# Patient Record
Sex: MALE | Race: WHITE | NOT HISPANIC OR LATINO | Employment: FULL TIME | URBAN - METROPOLITAN AREA
[De-identification: names, ages, dates, MRNs, and addresses within clinical notes are randomized per-mention and may not be internally consistent; named-entity substitution may affect disease eponyms.]

---

## 2018-06-11 ENCOUNTER — TELEPHONE (OUTPATIENT)
Dept: INTERNAL MEDICINE | Facility: CLINIC | Age: 43
End: 2018-06-11

## 2018-06-11 ENCOUNTER — OFFICE VISIT (OUTPATIENT)
Dept: INTERNAL MEDICINE | Facility: CLINIC | Age: 43
End: 2018-06-11

## 2018-06-11 VITALS
HEIGHT: 73 IN | DIASTOLIC BLOOD PRESSURE: 80 MMHG | OXYGEN SATURATION: 98 % | WEIGHT: 227 LBS | BODY MASS INDEX: 30.09 KG/M2 | HEART RATE: 85 BPM | SYSTOLIC BLOOD PRESSURE: 128 MMHG

## 2018-06-11 DIAGNOSIS — R79.89 LOW TESTOSTERONE IN MALE: Primary | ICD-10-CM

## 2018-06-11 DIAGNOSIS — N52.2 DRUG-INDUCED ERECTILE DYSFUNCTION: ICD-10-CM

## 2018-06-11 PROBLEM — F19.11 HISTORY OF SUBSTANCE ABUSE (HCC): Status: ACTIVE | Noted: 2018-06-11

## 2018-06-11 PROBLEM — Z86.59 HISTORY OF DEPRESSION: Status: ACTIVE | Noted: 2018-06-11

## 2018-06-11 PROCEDURE — 99203 OFFICE O/P NEW LOW 30 MIN: CPT | Performed by: NURSE PRACTITIONER

## 2018-06-11 RX ORDER — TESTOSTERONE 16.2 MG/G
4 GEL TRANSDERMAL
COMMUNITY
Start: 2018-03-05 | End: 2018-06-11 | Stop reason: SDUPTHER

## 2018-06-11 RX ORDER — SILDENAFIL CITRATE 20 MG/1
TABLET ORAL
Qty: 20 TABLET | Refills: 1 | Status: SHIPPED | OUTPATIENT
Start: 2018-06-11 | End: 2018-08-22 | Stop reason: SDUPTHER

## 2018-06-11 RX ORDER — TESTOSTERONE 16.2 MG/G
4 GEL TRANSDERMAL DAILY
Qty: 1 G | Refills: 0 | Status: SHIPPED | OUTPATIENT
Start: 2018-06-11 | End: 2018-08-22 | Stop reason: SDUPTHER

## 2018-06-11 RX ORDER — BUPRENORPHINE HYDROCHLORIDE AND NALOXONE HYDROCHLORIDE 5.7; 1.4 MG/1; MG/1
TABLET, ORALLY DISINTEGRATING SUBLINGUAL
COMMUNITY
Start: 2018-06-07

## 2018-06-11 NOTE — TELEPHONE ENCOUNTER
Pharmacy called stating that pt stated that he usually gets 4 boxes to last him a month and only 1 was sent in. Verbal ok to give pt 4 boxes ok per Whit.

## 2018-06-11 NOTE — PROGRESS NOTES
Subjective   Taras Phipps is a 42 y.o. male.   Chief Complaint   Patient presents with   • Establish Care   • Med Refill     natanael gel      History of Present Illness patient is here to establish care.  He is wanting a refill of Natanael gel that he takes for decreased testosterone level which he says is related to zubsolv usage.  He reports he had his last testosterone level checked approximately 3 months ago it [facility].  He states he is no longer going there as he did not feel comfortable with his provider.  He reports that AndroGel helps his energy.      The following portions of the patient's history were reviewed and updated as appropriate: allergies, current medications, past family history, past medical history, past social history, past surgical history and problem list.    Current Outpatient Prescriptions:   •  ANDROGEL PUMP 20.25 MG/ACT (1.62%) gel, Apply 4 applicators topically Daily., Disp: 1 g, Rfl: 0  •  sildenafil (REVATIO) 20 MG tablet, Use 1-3 tablets as needed PT intercourse, Disp: 20 tablet, Rfl: 1  •  ZUBSOLV 5.7-1.4 MG sublingual tablet, , Disp: , Rfl:     Review of Systems:   Constitutional : He denies unexplained weight loss, fevers chills or fatigue.  He says his energy is good  HEENT: He denies daily headaches, ear pain, ringing in the ears, sore throat, runny nose, blurring of vision.  He does not wear glasses or contacts.  Respiratory: Denies cough, shortness of air wheeze  Cardiovascular: Denies chest pain, fast or irregular heartbeats, rheumatic fever  GI: Denies heartburn, abdominal pain, blood in stool, nausea vomiting  : Has erectile dysfunction and uses generic Viagra with relief.  Denies dysuria.  No history of STD   Neuro mental he has a remote history of depression which manifested itself in anger.  He says it is under touch better control and does not take any medicines.  He is not in counseling.    Skin: No rashes  Hematological: no bruising, free bleeding  "    Family history:   Mom is .  She had a history of alcoholism and pill dependence.  She also had depression and unknown type of cancer   Father alive.  He uses alcohol regularly and has depression.   There is remote history of arthritis CAD hypertension obesity and thyroid disease in aunts and uncles    Social:   He is  ×18 years.  He lived with the same woman 3 years prior to their marriage.  They have 5 children.  He is a self-employed business owner.  The business is called [employer].  He does not smoke.  He has a history of narcotic abuse and is on Zubsolv.  He uses alcohol on a fairly regular basis though the amount varies from day-to-day.  He reports his alcohol usage does not interfere with his family or business life    Ops:   Tonsil removal 30 years ago   Eyelid skin cancer removal 10 years ago right eyelid        /80   Pulse 85   Ht 185.4 cm (73\")   Wt 103 kg (227 lb)   SpO2 98%   BMI 29.95 kg/m²     Objective   No Known Allergies    Physical Exam   Constitutional: He is oriented to person, place, and time. He appears well-developed and well-nourished. No distress.   HENT:   Head: Normocephalic.   Right Ear: External ear normal.   Left Ear: External ear normal.   Mouth/Throat: Oropharynx is clear and moist.   Eyes: Pupils are equal, round, and reactive to light. Right eye exhibits no discharge. Left eye exhibits no discharge.   Neck: Neck supple.   Cardiovascular: Normal rate, regular rhythm, normal heart sounds and intact distal pulses.  Exam reveals no gallop and no friction rub.    No murmur heard.  Pulmonary/Chest: Effort normal and breath sounds normal. No respiratory distress. He has no wheezes. He has no rales. He exhibits no tenderness.   Abdominal: Soft. There is no tenderness.   Lymphadenopathy:     He has no cervical adenopathy.   Neurological: He is alert and oriented to person, place, and time.   Skin: Skin is warm and dry.   Pink, no rash "   Psychiatric: He has a normal mood and affect. His behavior is normal. Thought content normal.   Nursing note and vitals reviewed.      Procedures    Assessment/Plan   Taras was seen today for establish care and med refill.    Diagnoses and all orders for this visit:    Low testosterone in male  -     ANDROGEL PUMP 20.25 MG/ACT (1.62%) gel; Apply 4 applicators topically Daily.  -     Comprehensive Metabolic Panel  -     Hepatitis Panel, Acute; Future    Drug-induced erectile dysfunction    Other orders  -     sildenafil (REVATIO) 20 MG tablet; Use 1-3 tablets as needed PT intercourse          Patient Instructions   Dictations as discussed.  Controlled substance agreement obtained.  [facility] process#[id number].  Laboratory request given for CMP and hepatitis screening.  We will get records from  [facility].  Recommend no alcohol use.  return to the clinic in 1 month for follow-up.      ANALISA Overton

## 2018-06-11 NOTE — PATIENT INSTRUCTIONS
Dictations as discussed.  Controlled substance agreement obtained.  [facility] process#[id number].  Laboratory request given for CMP and hepatitis screening.  [facility].  Recommend no alcohol use.  return to the clinic in 1 month for follow-up.

## 2018-06-12 ENCOUNTER — TELEPHONE (OUTPATIENT)
Dept: ENDOCRINOLOGY | Facility: CLINIC | Age: 43
End: 2018-06-12

## 2018-06-12 LAB
ALBUMIN SERPL-MCNC: 4.42 G/DL (ref 3.2–4.8)
ALBUMIN/GLOB SERPL: 1.6 G/DL (ref 1.5–2.5)
ALP SERPL-CCNC: 75 U/L (ref 25–100)
ALT SERPL-CCNC: 32 U/L (ref 7–40)
AST SERPL-CCNC: 27 U/L (ref 0–33)
BILIRUB SERPL-MCNC: 1 MG/DL (ref 0.3–1.2)
BUN SERPL-MCNC: 11 MG/DL (ref 9–23)
BUN/CREAT SERPL: 9.9 (ref 7–25)
CALCIUM SERPL-MCNC: 9.2 MG/DL (ref 8.7–10.4)
CHLORIDE SERPL-SCNC: 100 MMOL/L (ref 99–109)
CO2 SERPL-SCNC: 29 MMOL/L (ref 20–31)
CREAT SERPL-MCNC: 1.11 MG/DL (ref 0.6–1.3)
GFR SERPLBLD CREATININE-BSD FMLA CKD-EPI: 73 ML/MIN/1.73
GFR SERPLBLD CREATININE-BSD FMLA CKD-EPI: 88 ML/MIN/1.73
GLOBULIN SER CALC-MCNC: 2.8 GM/DL
GLUCOSE SERPL-MCNC: 98 MG/DL (ref 70–100)
HAV IGM SERPL QL IA: NEGATIVE
HBV CORE IGM SERPL QL IA: NEGATIVE
HBV SURFACE AG SERPL QL IA: NEGATIVE
HCV AB S/CO SERPL IA: <0.1 S/CO RATIO (ref 0–0.9)
POTASSIUM SERPL-SCNC: 4.6 MMOL/L (ref 3.5–5.5)
PROT SERPL-MCNC: 7.2 G/DL (ref 5.7–8.2)
SODIUM SERPL-SCNC: 142 MMOL/L (ref 132–146)

## 2018-06-12 NOTE — TELEPHONE ENCOUNTER
----- Message from ANALISA Son sent at 6/12/2018 11:30 AM EDT -----  Please call pt  Sugar, potassium, liver and kidney functions were normal  Hepatitis profile negative.  Thanks for allowing me to participate in your care!

## 2018-08-22 ENCOUNTER — OFFICE VISIT (OUTPATIENT)
Dept: INTERNAL MEDICINE | Facility: CLINIC | Age: 43
End: 2018-08-22

## 2018-08-22 VITALS
HEART RATE: 84 BPM | SYSTOLIC BLOOD PRESSURE: 132 MMHG | BODY MASS INDEX: 30.35 KG/M2 | OXYGEN SATURATION: 98 % | WEIGHT: 229 LBS | HEIGHT: 73 IN | DIASTOLIC BLOOD PRESSURE: 88 MMHG

## 2018-08-22 DIAGNOSIS — N52.9 ERECTILE DYSFUNCTION, UNSPECIFIED ERECTILE DYSFUNCTION TYPE: Primary | ICD-10-CM

## 2018-08-22 DIAGNOSIS — R79.89 LOW TESTOSTERONE IN MALE: ICD-10-CM

## 2018-08-22 PROCEDURE — 99213 OFFICE O/P EST LOW 20 MIN: CPT | Performed by: NURSE PRACTITIONER

## 2018-08-22 RX ORDER — TESTOSTERONE 16.2 MG/G
4 GEL TRANSDERMAL DAILY
Qty: 1 G | Refills: 0 | Status: SHIPPED | OUTPATIENT
Start: 2018-08-22 | End: 2018-12-12 | Stop reason: SDUPTHER

## 2018-08-22 RX ORDER — SILDENAFIL CITRATE 20 MG/1
TABLET ORAL
Qty: 40 TABLET | Refills: 1 | Status: SHIPPED | OUTPATIENT
Start: 2018-08-22 | End: 2018-12-12 | Stop reason: SDUPTHER

## 2018-08-22 RX ORDER — SILDENAFIL CITRATE 20 MG/1
TABLET ORAL
Qty: 20 TABLET | Refills: 1 | Status: SHIPPED | OUTPATIENT
Start: 2018-08-22 | End: 2018-08-22 | Stop reason: SDUPTHER

## 2018-08-22 NOTE — PROGRESS NOTES
"Subjective   Taras Phipps is a 42 y.o. male.   Chief Complaint   Patient presents with   • Med Refill   • Erectile Dysfunction   • low testosterone      History of Present Illness He is here requesting refill of his AndroGel.  He is requesting a three-month supply as it is cheaper for him.  He states that AndroGel has made his overall mood and fatigue better.  He would like a refill of generic Viagra.  He takes for his erectile dysfunction.  He says it helps.Patient denies fever chills, headache, ear pain, sore throat, shortness of air, cough, chest pain, abdominal pain, nausea vomiting diarrhea, dysuria, blood in stool or urine. Mood is good.  Eating and drinking as usual.    The following portions of the patient's history were reviewed and updated as appropriate: allergies, current medications, past family history, past medical history, past social history, past surgical history and problem list.    Current Outpatient Prescriptions:   •  ANDROGEL PUMP 20.25 MG/ACT (1.62%) gel, Apply 4 applicators topically Daily., Disp: 1 g, Rfl: 0  •  sildenafil (REVATIO) 20 MG tablet, Use 1-3 tablets as needed PT intercourse, Disp: 40 tablet, Rfl: 1  •  ZUBSOLV 5.7-1.4 MG sublingual tablet, , Disp: , Rfl:     Review of Systems Consitutional, HEENT, Respiratory, CV, GI, , Skin, Musculoskeletal, Neuro-mental, Endocrinological, Hematological were reviewed.  Positives were discussed in the HPI, otherwise ROS was negative   /88   Pulse 84   Ht 185.4 cm (73\")   Wt 104 kg (229 lb)   SpO2 98%   BMI 30.21 kg/m²     Objective   No Known Allergies    Physical Exam   Constitutional: He is oriented to person, place, and time. He appears well-developed and well-nourished. No distress.   Cardiovascular: Normal rate, regular rhythm, normal heart sounds and intact distal pulses.    Pulmonary/Chest: Effort normal and breath sounds normal. No respiratory distress.   Abdominal: Soft. There is no tenderness.   Neurological: He is alert " and oriented to person, place, and time.   Skin: Skin is warm and dry.   Is pink, no rash    Psychiatric: He has a normal mood and affect. His behavior is normal. Judgment and thought content normal.   Nursing note and vitals reviewed.      Procedures    Assessment/Plan   Taras was seen today for med refill, erectile dysfunction and low testosterone.    Diagnoses and all orders for this visit:    Erectile dysfunction, unspecified erectile dysfunction type    Low testosterone in male  -     ANDROGEL PUMP 20.25 MG/ACT (1.62%) gel; Apply 4 applicators topically Daily.    Other orders  -     Discontinue: sildenafil (REVATIO) 20 MG tablet; Use 1-3 tablets as needed PT intercourse  -     sildenafil (REVATIO) 20 MG tablet; Use 1-3 tablets as needed PT intercourse          Patient Instructions   Medications as discussed.  Return to the clinic in 3 months sooner if needed      ANALISA Overton

## 2018-12-12 ENCOUNTER — OFFICE VISIT (OUTPATIENT)
Dept: INTERNAL MEDICINE | Facility: CLINIC | Age: 43
End: 2018-12-12

## 2018-12-12 VITALS
WEIGHT: 228.2 LBS | TEMPERATURE: 97.6 F | HEART RATE: 84 BPM | HEIGHT: 73 IN | BODY MASS INDEX: 30.24 KG/M2 | DIASTOLIC BLOOD PRESSURE: 94 MMHG | SYSTOLIC BLOOD PRESSURE: 144 MMHG

## 2018-12-12 DIAGNOSIS — R79.89 LOW TESTOSTERONE IN MALE: ICD-10-CM

## 2018-12-12 DIAGNOSIS — F32.5 MAJOR DEPRESSIVE DISORDER WITH SINGLE EPISODE, IN FULL REMISSION (HCC): Primary | ICD-10-CM

## 2018-12-12 DIAGNOSIS — R03.0 ELEVATED BLOOD PRESSURE READING: ICD-10-CM

## 2018-12-12 PROCEDURE — 99213 OFFICE O/P EST LOW 20 MIN: CPT | Performed by: NURSE PRACTITIONER

## 2018-12-12 RX ORDER — TESTOSTERONE 16.2 MG/G
4 GEL TRANSDERMAL DAILY
Qty: 1 G | Refills: 0 | Status: SHIPPED | OUTPATIENT
Start: 2018-12-12 | End: 2019-01-09 | Stop reason: SDUPTHER

## 2018-12-12 RX ORDER — DESVENLAFAXINE SUCCINATE 50 MG/1
TABLET, EXTENDED RELEASE ORAL
COMMUNITY
Start: 2018-09-27 | End: 2018-12-12

## 2018-12-12 RX ORDER — SILDENAFIL CITRATE 20 MG/1
TABLET ORAL
Qty: 40 TABLET | Refills: 1 | Status: SHIPPED | OUTPATIENT
Start: 2018-12-12 | End: 2019-02-07 | Stop reason: SDUPTHER

## 2018-12-12 NOTE — PROGRESS NOTES
"Subjective   Taras Phipps is a 43 y.o. male.   Chief Complaint   Patient presents with   • Follow-up      History of Present Illness Not taking Prisitq but is doing OK without it.  Denies thoughts of self-harm.  Still uses Androgel for low testosterone.  Uses Revatio for ED.  Patient denies fever chills, headache, ear pain, sore throat, shortness of air, cough, chest pain, abdominal pain, nausea, vomiting, diarrhea, dysuria, blood in stool or urine. Mood is good.  Eating and drinking as usual.  No unexplained weight loss or gain.    His BP is elevated today.  He just drank monster energy drink PTA.      The following portions of the patient's history were reviewed and updated as appropriate: allergies, current medications, past family history, past medical history, past social history, past surgical history and problem list.    Current Outpatient Medications:   •  ANDROGEL PUMP 20.25 MG/ACT (1.62%) gel, Apply 4 applicators topically Daily., Disp: 1 g, Rfl: 0  •  sildenafil (REVATIO) 20 MG tablet, Use 1-3 tablets as needed PT intercourse, Disp: 40 tablet, Rfl: 1  •  ZUBSOLV 5.7-1.4 MG sublingual tablet, , Disp: , Rfl:     Review of Systems Consitutional, HEENT, Respiratory, CV, GI, , Skin, Musculoskeletal, Neuro-mental, Endocrinological, Hematological were reviewed.  Positives were discussed in the HPI, otherwise ROS was negative   /94   Pulse 84   Temp 97.6 °F (36.4 °C)   Ht 185.4 cm (73\")   Wt 104 kg (228 lb 3.2 oz)   BMI 30.11 kg/m²     Objective   No Known Allergies    Physical Exam   Constitutional: He is oriented to person, place, and time. He appears well-developed and well-nourished.   HENT:   Head: Normocephalic.   Eyes: Right eye exhibits no discharge. Left eye exhibits no discharge.   Neck: Neck supple.   Cardiovascular: Normal rate, regular rhythm, normal heart sounds and intact distal pulses. Exam reveals no gallop and no friction rub.   No murmur heard.  Pulmonary/Chest: Effort normal and " breath sounds normal. No stridor. No respiratory distress. He has no wheezes. He has no rales.   Neurological: He is alert and oriented to person, place, and time.   Skin: Skin is warm and dry. Capillary refill takes less than 2 seconds.   Is pink, no rash    Psychiatric: He has a normal mood and affect. His behavior is normal. Judgment and thought content normal.   Nursing note and vitals reviewed.      Procedures    LABS  Results for orders placed or performed in visit on 06/11/18   Comprehensive Metabolic Panel   Result Value Ref Range    Glucose 98 70 - 100 mg/dL    BUN 11 9 - 23 mg/dL    Creatinine 1.11 0.60 - 1.30 mg/dL    eGFR Non African Am 73 >60 mL/min/1.73    eGFR African Am 88 >60 mL/min/1.73    BUN/Creatinine Ratio 9.9 7.0 - 25.0    Sodium 142 132 - 146 mmol/L    Potassium 4.6 3.5 - 5.5 mmol/L    Chloride 100 99 - 109 mmol/L    Total CO2 29.0 20.0 - 31.0 mmol/L    Calcium 9.2 8.7 - 10.4 mg/dL    Total Protein 7.2 5.7 - 8.2 g/dL    Albumin 4.42 3.20 - 4.80 g/dL    Globulin 2.8 gm/dL    A/G Ratio 1.6 1.5 - 2.5 g/dL    Total Bilirubin 1.0 0.3 - 1.2 mg/dL    Alkaline Phosphatase 75 25 - 100 U/L    AST (SGOT) 27 0 - 33 U/L    ALT (SGPT) 32 7 - 40 U/L   Hepatitis Panel, Acute   Result Value Ref Range    Hep A IgM Negative Negative    Hepatitis B Surface Ag Negative Negative    Hep B Core IgM Negative Negative    Hep C Virus Ab <0.1 0.0 - 0.9 s/co ratio       Assessment/Plan   Taras was seen today for follow-up.    Diagnoses and all orders for this visit:    Major depressive disorder with single episode, in full remission (CMS/HCC)    Low testosterone in male  -     ANDROGEL PUMP 20.25 MG/ACT (1.62%) gel; Apply 4 applicators topically Daily.  -     sildenafil (REVATIO) 20 MG tablet; Use 1-3 tablets as needed PT intercourse    Elevated blood pressure reading        Patient Instructions   Monitor BP Once weekly, goal is less than 130/80, preferably less than 120/70.  Avoid energy drinks.  Avoid sodium..  Cont  same meds.  Pt verbalizes understanding and agreement with plan of care.         EMR Dragon/transcription disclaimer:  Please note that portions of this note were completed with a voice recognition program.  Electronic transcription of the voice recognition program may permit erroneous words or phrases to be inadvertently transcribed.  Although I have reviewed the note for such errors, some may still exist in this documentation   Whit Oden, APRN

## 2018-12-12 NOTE — PATIENT INSTRUCTIONS
Monitor BP Once weekly, goal is less than 130/80, preferably less than 120/70.  Avoid energy drinks.  Avoid sodium..  Cont same meds.  Pt verbalizes understanding and agreement with plan of care.

## 2019-01-09 DIAGNOSIS — R79.89 LOW TESTOSTERONE IN MALE: ICD-10-CM

## 2019-01-09 RX ORDER — TESTOSTERONE 16.2 MG/G
4 GEL TRANSDERMAL DAILY
Qty: 1 G | Refills: 0 | Status: SHIPPED | OUTPATIENT
Start: 2019-01-09 | End: 2019-02-07 | Stop reason: SDUPTHER

## 2019-01-09 RX ORDER — TESTOSTERONE 16.2 MG/G
4 GEL TRANSDERMAL DAILY
Qty: 1 G | Refills: 0 | Status: CANCELLED | OUTPATIENT
Start: 2019-01-09

## 2019-01-09 NOTE — TELEPHONE ENCOUNTER
NEEDS 30 DAY SUPPLY OF THIS MEDICATION HE NEEDS 2 BOTTLES. HE USES 4 PUMPS A DAY. HE IS OUT OF THIS MEDICATION AND NEEDS IT REFILLED AS SOON AS POSSIBLE.

## 2019-02-06 DIAGNOSIS — R79.89 LOW TESTOSTERONE IN MALE: ICD-10-CM

## 2019-02-06 RX ORDER — SILDENAFIL CITRATE 20 MG/1
TABLET ORAL
Qty: 40 TABLET | Refills: 1 | Status: CANCELLED | OUTPATIENT
Start: 2019-02-06

## 2019-02-06 RX ORDER — TESTOSTERONE 16.2 MG/G
4 GEL TRANSDERMAL DAILY
Qty: 1 G | Refills: 0 | Status: CANCELLED | OUTPATIENT
Start: 2019-02-06

## 2019-02-07 DIAGNOSIS — R79.89 LOW TESTOSTERONE IN MALE: ICD-10-CM

## 2019-02-07 RX ORDER — SILDENAFIL CITRATE 20 MG/1
TABLET ORAL
Qty: 40 TABLET | Refills: 1 | Status: SHIPPED | OUTPATIENT
Start: 2019-02-07 | End: 2019-04-05 | Stop reason: SDUPTHER

## 2019-02-07 RX ORDER — TESTOSTERONE 16.2 MG/G
4 GEL TRANSDERMAL DAILY
Qty: 1 G | Refills: 0 | Status: SHIPPED | OUTPATIENT
Start: 2019-02-07 | End: 2019-03-21 | Stop reason: SDUPTHER

## 2019-02-08 DIAGNOSIS — R79.89 LOW TESTOSTERONE IN MALE: ICD-10-CM

## 2019-02-08 RX ORDER — TESTOSTERONE 16.2 MG/G
4 GEL TRANSDERMAL DAILY
Qty: 1 G | Refills: 0 | Status: CANCELLED | OUTPATIENT
Start: 2019-02-08

## 2019-03-20 DIAGNOSIS — R79.89 LOW TESTOSTERONE IN MALE: ICD-10-CM

## 2019-03-20 RX ORDER — TESTOSTERONE 16.2 MG/G
4 GEL TRANSDERMAL DAILY
Qty: 1 G | Refills: 0 | Status: CANCELLED | OUTPATIENT
Start: 2019-03-20

## 2019-03-21 ENCOUNTER — TELEPHONE (OUTPATIENT)
Dept: INTERNAL MEDICINE | Facility: CLINIC | Age: 44
End: 2019-03-21

## 2019-03-21 DIAGNOSIS — R79.89 LOW TESTOSTERONE IN MALE: ICD-10-CM

## 2019-03-21 RX ORDER — TESTOSTERONE 16.2 MG/G
4 GEL TRANSDERMAL DAILY
Qty: 1 G | Refills: 0 | OUTPATIENT
Start: 2019-03-21 | End: 2019-04-05 | Stop reason: SDUPTHER

## 2019-03-21 NOTE — TELEPHONE ENCOUNTER
PATIENT IS CALLING BACK STATING PHARMACY STILL DOES NOT HAVE THIS PRESCRIPTION. HE IS NEEDING US TO CALL OR RE-E-SCRIBE THIS PRESCRIPTION INTO [facility]. PATIENTS NUMBER [phone]

## 2019-03-22 DIAGNOSIS — R79.89 LOW TESTOSTERONE IN MALE: ICD-10-CM

## 2019-03-22 NOTE — TELEPHONE ENCOUNTER
PATIENT IS CALLING AGAIN, PATIENT IS SAYING THAT [facility] IN [place] IS SAYING THAT THEY DO NOT HAVE A RX FOR HIM. COULD SOMEONE PLEASE CALL IN HIS ANDROGEL PUMP AGAIN, AND CALL PATIENT AND LET HIM KNOW, HE SAID HE WILL BE OUT TODAY. PATIENT CAN BE REACHED [phone]

## 2019-04-05 ENCOUNTER — OFFICE VISIT (OUTPATIENT)
Dept: INTERNAL MEDICINE | Facility: CLINIC | Age: 44
End: 2019-04-05

## 2019-04-05 VITALS
HEIGHT: 73 IN | OXYGEN SATURATION: 99 % | HEART RATE: 75 BPM | WEIGHT: 225 LBS | DIASTOLIC BLOOD PRESSURE: 98 MMHG | SYSTOLIC BLOOD PRESSURE: 168 MMHG | TEMPERATURE: 98 F | BODY MASS INDEX: 29.82 KG/M2

## 2019-04-05 DIAGNOSIS — R79.89 LOW TESTOSTERONE IN MALE: ICD-10-CM

## 2019-04-05 DIAGNOSIS — R03.0 ELEVATED BLOOD PRESSURE READING: ICD-10-CM

## 2019-04-05 DIAGNOSIS — N52.2 DRUG-INDUCED ERECTILE DYSFUNCTION: Primary | ICD-10-CM

## 2019-04-05 PROCEDURE — 99213 OFFICE O/P EST LOW 20 MIN: CPT | Performed by: NURSE PRACTITIONER

## 2019-04-05 RX ORDER — TESTOSTERONE 16.2 MG/G
4 GEL TRANSDERMAL DAILY
Qty: 1 G | Refills: 0 | OUTPATIENT
Start: 2019-04-05

## 2019-04-05 RX ORDER — SILDENAFIL CITRATE 20 MG/1
TABLET ORAL
Qty: 40 TABLET | Refills: 1 | Status: SHIPPED | OUTPATIENT
Start: 2019-04-05 | End: 2019-05-09 | Stop reason: SDUPTHER

## 2019-04-05 RX ORDER — TESTOSTERONE 16.2 MG/G
4 GEL TRANSDERMAL DAILY
Qty: 1 G | Refills: 0 | OUTPATIENT
Start: 2019-04-05 | End: 2019-04-07 | Stop reason: SDUPTHER

## 2019-04-05 NOTE — TELEPHONE ENCOUNTER
PATIENT NEEDS A REFILL ON ANDROGEL ALSO, HE FORGOT TO ASK FOR IT TODAY AT HIS APPT. HIS PHARMACY IS [facility] IN [place]. PT  CAN BE REACHED [phone]

## 2019-04-05 NOTE — PROGRESS NOTES
Chief Complaint   Patient presents with   • Erectile Dysfunction     med refill       History of Present Illness  43 y.o.male presents for erectile dysfunction follow-up.  History of low testosterone and drug-induced erectile dysfunction.  He needs medication refills.  Takes sildenafil as needed without difficulties.  Also uses AndroGel.      He has some recent history of elevated blood pressure.  Is still elevated today.  He still continues to drink daily Monster energy drinks.  At home his blood pressure has been a little better than in the office with systolic number in the 140s 150s.. Denies any headaches, dizziness, vision changes or edema.    Review of Systems   Constitutional: Negative for chills, fatigue and fever.   Eyes: Negative for blurred vision and visual disturbance.   Respiratory: Negative for shortness of breath.    Cardiovascular: Negative for chest pain, palpitations and leg swelling.   Genitourinary: Positive for erectile dysfunction. Negative for difficulty urinating.   Neurological: Negative for dizziness, light-headedness and headache.         [facility]  The following portions of the patient's history were reviewed and updated as appropriate: allergies, current medications, past family history, past medical history, past social history, past surgical history and problem list.       Past Medical History:   Diagnosis Date   • Depression       Past Surgical History:   Procedure Laterality Date   • EYELID LACERATION REPAIR      cancer   • TONSILLECTOMY        No Known Allergies   Family History   Problem Relation Age of Onset   • Cancer Mother    • Mental illness Mother    • Mental illness Father    • Obesity Maternal Aunt    • Thyroid disease Maternal Aunt    • Obesity Maternal Uncle    • Arthritis Maternal Grandmother    • Hypertension Maternal Grandmother    • Heart attack Maternal Grandfather             Current Outpatient Medications:   •  ANDROGEL PUMP 20.25 MG/ACT (1.62%) gel, Apply 4  "applicators topically Daily., Disp: 1 g, Rfl: 0  •  sildenafil (REVATIO) 20 MG tablet, Use 1-3 tablets as needed PT intercourse, Disp: 40 tablet, Rfl: 1  •  ZUBSOLV 5.7-1.4 MG sublingual tablet, , Disp: , Rfl:     VITALS:  /98   Pulse 75   Temp 98 °F (36.7 °C)   Ht 185.4 cm (73\")   Wt 102 kg (225 lb)   SpO2 99%   BMI 29.69 kg/m²   160/88    Physical Exam   Constitutional: He is oriented to person, place, and time. He appears well-developed and well-nourished. No distress.   HENT:   Head: Normocephalic.   Eyes: Pupils are equal, round, and reactive to light.   Neck: No JVD present.   Cardiovascular: Normal rate, regular rhythm and normal heart sounds.   No murmur heard.  No edema   Pulmonary/Chest: Effort normal and breath sounds normal. No respiratory distress.   Neurological: He is alert and oriented to person, place, and time.   Skin: Skin is warm and dry. Capillary refill takes less than 2 seconds. He is not diaphoretic.   Psychiatric: He has a normal mood and affect.   Nursing note and vitals reviewed.      LABS  No new labs    ASSESSMENT/PLAN  Taras was seen today for erectile dysfunction.    Diagnoses and all orders for this visit:    Drug-induced erectile dysfunction  -     sildenafil (REVATIO) 20 MG tablet; Use 1-3 tablets as needed PT intercourse    Low testosterone in male  Med addressed per PCP  -     ANDROGEL PUMP 20.25 MG/ACT (1.62%) gel; Apply 4 applicators topically Daily.    Elevated blood pressure reading  Reviewed in detail with patient that his blood pressures continue to stay high.  He needs to stop drinking his monster drinks.  If his blood pressure does not get controlled we would have to start him on a blood pressure medication which could possibly further add to his erectile dysfunction.  Hypertension like this staying in control could also be a contraindication to his testosterone replacement therapy.  He wants to try these above items for an additional 3 months to see if he " get his blood pressure to come down without medication.  He needs to follow-up in 3 months with his PCP.    I discussed the patients findings and my recommendations with patient.  Patient was encouraged to keep me informed of any acute changes, lack of improvement, or any new concerning symptoms.    Patient voiced understanding of all instructions and denied further questions.      FOLLOW-UP  Return in about 3 months (around 7/5/2019) for Recheck with Whit BP check.    Electronically signed by:    ANALISA Cartagena  04/05/2019

## 2019-04-05 NOTE — PATIENT INSTRUCTIONS
"DASH Eating Plan  DASH stands for \"Dietary Approaches to Stop Hypertension.\" The DASH eating plan is a healthy eating plan that has been shown to reduce high blood pressure (hypertension). It may also reduce your risk for type 2 diabetes, heart disease, and stroke. The DASH eating plan may also help with weight loss.  What are tips for following this plan?  General guidelines  · Avoid eating more than 2,300 mg (milligrams) of salt (sodium) a day. If you have hypertension, you may need to reduce your sodium intake to 1,500 mg a day.  · Limit alcohol intake to no more than 1 drink a day for nonpregnant women and 2 drinks a day for men. One drink equals 12 oz of beer, 5 oz of wine, or 1½ oz of hard liquor.  · Work with your health care provider to maintain a healthy body weight or to lose weight. Ask what an ideal weight is for you.  · Get at least 30 minutes of exercise that causes your heart to beat faster (aerobic exercise) most days of the week. Activities may include walking, swimming, or biking.  · Work with your health care provider or diet and nutrition specialist (dietitian) to adjust your eating plan to your individual calorie needs.  Reading food labels  · Check food labels for the amount of sodium per serving. Choose foods with less than 5 percent of the Daily Value of sodium. Generally, foods with less than 300 mg of sodium per serving fit into this eating plan.  · To find whole grains, look for the word \"whole\" as the first word in the ingredient list.  Shopping  · Buy products labeled as \"low-sodium\" or \"no salt added.\"  · Buy fresh foods. Avoid canned foods and premade or frozen meals.  Cooking  · Avoid adding salt when cooking. Use salt-free seasonings or herbs instead of table salt or sea salt. Check with your health care provider or pharmacist before using salt substitutes.  · Do not chavez foods. Cook foods using healthy methods such as baking, boiling, grilling, and broiling instead.  · Cook with " heart-healthy oils, such as olive, canola, soybean, or sunflower oil.  Meal planning    · Eat a balanced diet that includes:  ? 5 or more servings of fruits and vegetables each day. At each meal, try to fill half of your plate with fruits and vegetables.  ? Up to 6-8 servings of whole grains each day.  ? Less than 6 oz of lean meat, poultry, or fish each day. A 3-oz serving of meat is about the same size as a deck of cards. One egg equals 1 oz.  ? 2 servings of low-fat dairy each day.  ? A serving of nuts, seeds, or beans 5 times each week.  ? Heart-healthy fats. Healthy fats called Omega-3 fatty acids are found in foods such as flaxseeds and coldwater fish, like sardines, salmon, and mackerel.  · Limit how much you eat of the following:  ? Canned or prepackaged foods.  ? Food that is high in trans fat, such as fried foods.  ? Food that is high in saturated fat, such as fatty meat.  ? Sweets, desserts, sugary drinks, and other foods with added sugar.  ? Full-fat dairy products.  · Do not salt foods before eating.  · Try to eat at least 2 vegetarian meals each week.  · Eat more home-cooked food and less restaurant, buffet, and fast food.  · When eating at a restaurant, ask that your food be prepared with less salt or no salt, if possible.  What foods are recommended?  The items listed may not be a complete list. Talk with your dietitian about what dietary choices are best for you.  Grains  Whole-grain or whole-wheat bread. Whole-grain or whole-wheat pasta. Brown rice. Oatmeal. Quinoa. Bulgur. Whole-grain and low-sodium cereals. Pamela bread. Low-fat, low-sodium crackers. Whole-wheat flour tortillas.  Vegetables  Fresh or frozen vegetables (raw, steamed, roasted, or grilled). Low-sodium or reduced-sodium tomato and vegetable juice. Low-sodium or reduced-sodium tomato sauce and tomato paste. Low-sodium or reduced-sodium canned vegetables.  Fruits  All fresh, dried, or frozen fruit. Canned fruit in natural juice (without  added sugar).  Meat and other protein foods  Skinless chicken or turkey. Ground chicken or turkey. Pork with fat trimmed off. Fish and seafood. Egg whites. Dried beans, peas, or lentils. Unsalted nuts, nut butters, and seeds. Unsalted canned beans. Lean cuts of beef with fat trimmed off. Low-sodium, lean deli meat.  Dairy  Low-fat (1%) or fat-free (skim) milk. Fat-free, low-fat, or reduced-fat cheeses. Nonfat, low-sodium ricotta or cottage cheese. Low-fat or nonfat yogurt. Low-fat, low-sodium cheese.  Fats and oils  Soft margarine without trans fats. Vegetable oil. Low-fat, reduced-fat, or light mayonnaise and salad dressings (reduced-sodium). Canola, safflower, olive, soybean, and sunflower oils. Avocado.  Seasoning and other foods  Herbs. Spices. Seasoning mixes without salt. Unsalted popcorn and pretzels. Fat-free sweets.  What foods are not recommended?  The items listed may not be a complete list. Talk with your dietitian about what dietary choices are best for you.  Grains  Baked goods made with fat, such as croissants, muffins, or some breads. Dry pasta or rice meal packs.  Vegetables  Creamed or fried vegetables. Vegetables in a cheese sauce. Regular canned vegetables (not low-sodium or reduced-sodium). Regular canned tomato sauce and paste (not low-sodium or reduced-sodium). Regular tomato and vegetable juice (not low-sodium or reduced-sodium). Pickles. Olives.  Fruits  Canned fruit in a light or heavy syrup. Fried fruit. Fruit in cream or butter sauce.  Meat and other protein foods  Fatty cuts of meat. Ribs. Fried meat. Maldonado. Sausage. Bologna and other processed lunch meats. Salami. Fatback. Hotdogs. Bratwurst. Salted nuts and seeds. Canned beans with added salt. Canned or smoked fish. Whole eggs or egg yolks. Chicken or turkey with skin.  Dairy  Whole or 2% milk, cream, and half-and-half. Whole or full-fat cream cheese. Whole-fat or sweetened yogurt. Full-fat cheese. Nondairy creamers. Whipped toppings.  Processed cheese and cheese spreads.  Fats and oils  Butter. Stick margarine. Lard. Shortening. Ghee. Maldonado fat. Tropical oils, such as coconut, palm kernel, or palm oil.  Seasoning and other foods  Salted popcorn and pretzels. Onion salt, garlic salt, seasoned salt, table salt, and sea salt. Worcestershire sauce. Tartar sauce. Barbecue sauce. Teriyaki sauce. Soy sauce, including reduced-sodium. Steak sauce. Canned and packaged gravies. Fish sauce. Oyster sauce. Cocktail sauce. Horseradish that you find on the shelf. Ketchup. Mustard. Meat flavorings and tenderizers. Bouillon cubes. Hot sauce and Tabasco sauce. Premade or packaged marinades. Premade or packaged taco seasonings. Relishes. Regular salad dressings.  Where to find more information:  · National Heart, Lung, and Blood [place]: www.nhlbi.nih.gov  · American Heart Association: www.heart.org  Summary  · The DASH eating plan is a healthy eating plan that has been shown to reduce high blood pressure (hypertension). It may also reduce your risk for type 2 diabetes, heart disease, and stroke.  · With the DASH eating plan, you should limit salt (sodium) intake to 2,300 mg a day. If you have hypertension, you may need to reduce your sodium intake to 1,500 mg a day.  · When on the DASH eating plan, aim to eat more fresh fruits and vegetables, whole grains, lean proteins, low-fat dairy, and heart-healthy fats.  · Work with your health care provider or diet and nutrition specialist (dietitian) to adjust your eating plan to your individual calorie needs.  This information is not intended to replace advice given to you by your health care provider. Make sure you discuss any questions you have with your health care provider.  Document Released: 12/06/2012 Document Revised: 12/11/2017 Document Reviewed: 12/11/2017  [employer] Interactive Patient Education © 2019 [employer]

## 2019-04-06 NOTE — TELEPHONE ENCOUNTER
PATIENT IS CALLING, SAYING YOU ONLY CALLED HIM IN A 2 WEEK SUPPLY ON THE ANDROGEL, HE SAYS IT COSTS THE SAME FOR 2 WEEKS AS IT DOES FOR 1 MONTH, HE WOULD LIKE TO KNOW IF YOU WOULD CHANGE IT TO 1 MONTH PLEASE. HE HAS NOT PICKED IT UP YET. . HE SAID THIS HAPPENS EVERY MONTH AND HE WOULD LIKE TO GET IT FIXED IF POSSIBLE. PT WOULD LIKE A CALL WHEN THIS IS DONE [phone]. PT'S PHARMACY IS [facility] [place]

## 2019-04-07 DIAGNOSIS — R79.89 LOW TESTOSTERONE IN MALE: ICD-10-CM

## 2019-04-07 RX ORDER — TESTOSTERONE 16.2 MG/G
4 GEL TRANSDERMAL DAILY
Qty: 1 G | Refills: 0 | OUTPATIENT
Start: 2019-04-07 | End: 2019-04-11 | Stop reason: SDUPTHER

## 2019-04-08 ENCOUNTER — TELEPHONE (OUTPATIENT)
Dept: INTERNAL MEDICINE | Facility: CLINIC | Age: 44
End: 2019-04-08

## 2019-04-08 NOTE — TELEPHONE ENCOUNTER
[facility] CALLED AND STATED THAT THE KAITLYNN GEL PRESCRIPTION  WAS ONLY WRITTEN FOR 1 BOTTLE AND IT NEEDS TO BE FOR 2 BOTTLES; PT. USES  4 PUMPS A DAY INSTEAD OF THE STANDARD DOSAGE;  PLEASE CONFIRM [phone]

## 2019-04-09 ENCOUNTER — PRIOR AUTHORIZATION (OUTPATIENT)
Dept: INTERNAL MEDICINE | Facility: CLINIC | Age: 44
End: 2019-04-09

## 2019-04-09 NOTE — TELEPHONE ENCOUNTER
Rosy stated that Androgel 4 pumps/day did get processed and picked up by pt at [facility] on [address]. Pt will need new supply for 2 1/2 months after this 2 week supply is done.

## 2019-04-10 ENCOUNTER — PRIOR AUTHORIZATION (OUTPATIENT)
Dept: INTERNAL MEDICINE | Facility: CLINIC | Age: 44
End: 2019-04-10

## 2019-04-11 ENCOUNTER — TELEPHONE (OUTPATIENT)
Dept: INTERNAL MEDICINE | Facility: CLINIC | Age: 44
End: 2019-04-11

## 2019-04-11 DIAGNOSIS — R79.89 LOW TESTOSTERONE IN MALE: ICD-10-CM

## 2019-04-11 RX ORDER — TESTOSTERONE 16.2 MG/G
4 GEL TRANSDERMAL DAILY
Qty: 150 G | Refills: 2 | Status: SHIPPED | OUTPATIENT
Start: 2019-04-11 | End: 2019-06-12 | Stop reason: SDUPTHER

## 2019-04-11 NOTE — TELEPHONE ENCOUNTER
Patient is here needing clarification regarding testosterone dosage.  He is supposed to use 4 pumps daily.  75 g is only lasting him 2 weeks.  We will change to  150.  [place] [employer] rags indicate I can only call in 1 month at a time.    It makes him very nervous not to have refills.  I discussed with Dr. Watson-  she is willing to give him 2 additional refills.  Pravin [id number] is appropriate and on chart

## 2019-04-12 ENCOUNTER — TELEPHONE (OUTPATIENT)
Dept: INTERNAL MEDICINE | Facility: CLINIC | Age: 44
End: 2019-04-12

## 2019-04-19 ENCOUNTER — OFFICE VISIT (OUTPATIENT)
Dept: INTERNAL MEDICINE | Facility: CLINIC | Age: 44
End: 2019-04-19

## 2019-04-19 VITALS
BODY MASS INDEX: 29.95 KG/M2 | HEART RATE: 89 BPM | OXYGEN SATURATION: 99 % | DIASTOLIC BLOOD PRESSURE: 80 MMHG | WEIGHT: 226 LBS | HEIGHT: 73 IN | SYSTOLIC BLOOD PRESSURE: 132 MMHG

## 2019-04-19 DIAGNOSIS — R79.89 LOW TESTOSTERONE IN MALE: Primary | ICD-10-CM

## 2019-04-19 PROCEDURE — 99213 OFFICE O/P EST LOW 20 MIN: CPT | Performed by: NURSE PRACTITIONER

## 2019-04-19 NOTE — PROGRESS NOTES
"Subjective   Taras Phipps is a 43 y.o. male.   Chief Complaint   Patient presents with   • Medication consult     Pt in today to discuss testosterone denial       History of Present Illness patient reports he has had low testosterone since June 2018.  He relates it is related to his Suboxone use.  He says that testosterone helps him to have more energy and has improved his quality of life.  Patient denies fever chills, headache, ear pain, sore throat, shortness of air, cough, wheezing, chest pain, abdominal pain, nausea, vomiting, diarrhea, dysuria, blood in stool or urine.  Mood is good.  Eating and drinking as usual.  No unexplained weight loss or gain.  .    The following portions of the patient's history were reviewed and updated as appropriate: allergies, current medications, past family history, past medical history, past social history, past surgical history and problem list.    Current Outpatient Medications:   •  ANDROGEL PUMP 20.25 MG/ACT (1.62%) gel, Apply 4 Pump topically Daily., Disp: 150 g, Rfl: 2  •  sildenafil (REVATIO) 20 MG tablet, Use 1-3 tablets as needed PT intercourse, Disp: 40 tablet, Rfl: 1  •  ZUBSOLV 5.7-1.4 MG sublingual tablet, , Disp: , Rfl:     Review of Systems Consitutional, HEENT, Respiratory, CV, GI, , Skin, Musculoskeletal, Neuro-mental, Endocrinological, Hematological were reviewed.  Positives were discussed in the HPI, otherwise ROS was negative   /80   Pulse 89   Ht 185.4 cm (73\")   Wt 103 kg (226 lb)   SpO2 99%   BMI 29.82 kg/m²     Objective   No Known Allergies    Physical Exam   Constitutional: He is oriented to person, place, and time. He appears well-developed and well-nourished.   HENT:   Head: Normocephalic.   Neck: Neck supple.   Cardiovascular: Normal rate, regular rhythm, normal heart sounds and intact distal pulses. Exam reveals no gallop and no friction rub.   No murmur heard.  Pulmonary/Chest: Effort normal and breath sounds normal. No stridor. No " respiratory distress. He has no wheezes. He has no rales.   Neurological: He is alert and oriented to person, place, and time.   Skin: Skin is warm and dry. Capillary refill takes less than 2 seconds.   Psychiatric: He has a normal mood and affect. His behavior is normal. Judgment normal.   Nursing note and vitals reviewed.      Procedures    LABS  Results for orders placed or performed in visit on 06/11/18   Comprehensive Metabolic Panel   Result Value Ref Range    Glucose 98 70 - 100 mg/dL    BUN 11 9 - 23 mg/dL    Creatinine 1.11 0.60 - 1.30 mg/dL    eGFR Non African Am 73 >60 mL/min/1.73    eGFR African Am 88 >60 mL/min/1.73    BUN/Creatinine Ratio 9.9 7.0 - 25.0    Sodium 142 132 - 146 mmol/L    Potassium 4.6 3.5 - 5.5 mmol/L    Chloride 100 99 - 109 mmol/L    Total CO2 29.0 20.0 - 31.0 mmol/L    Calcium 9.2 8.7 - 10.4 mg/dL    Total Protein 7.2 5.7 - 8.2 g/dL    Albumin 4.42 3.20 - 4.80 g/dL    Globulin 2.8 gm/dL    A/G Ratio 1.6 1.5 - 2.5 g/dL    Total Bilirubin 1.0 0.3 - 1.2 mg/dL    Alkaline Phosphatase 75 25 - 100 U/L    AST (SGOT) 27 0 - 33 U/L    ALT (SGPT) 32 7 - 40 U/L   Hepatitis Panel, Acute   Result Value Ref Range    Hep A IgM Negative Negative    Hepatitis B Surface Ag Negative Negative    Hep B Core IgM Negative Negative    Hep C Virus Ab <0.1 0.0 - 0.9 s/co ratio       Assessment/Plan   Taras was seen today for medication consult.    Diagnoses and all orders for this visit:    Low testosterone in male  -     Testosterone; Future      Patient Instructions   We will need to repeat a testosterone level 1 or 2 days before next refill.  Continue AndroGel (does not need a refill).  Pt verbalizes understanding and agreement with plan of care.     EMR Dragon/transcription disclaimer:  Please note that portions of this note were completed with a voice recognition program.  Electronic transcription of the voice recognition program may permit erroneous words or phrases to be inadvertently transcribed.   Although I have reviewed the note for such errors, some may still exist in this documentation       Whit Oden, APRN

## 2019-04-22 NOTE — PATIENT INSTRUCTIONS
We will need to repeat a testosterone level 1 or 2 days before next refill.  Continue AndroGel (does not need a refill).  Pt verbalizes understanding and agreement with plan of care.

## 2019-05-07 ENCOUNTER — TELEPHONE (OUTPATIENT)
Dept: INTERNAL MEDICINE | Facility: CLINIC | Age: 44
End: 2019-05-07

## 2019-05-07 NOTE — TELEPHONE ENCOUNTER
Patient states that androgel was supposed to be refilled and [address] has not received it.  He is out of medication and is concerned.  Could he please have this refilled today?  Thank you.

## 2019-05-07 NOTE — TELEPHONE ENCOUNTER
Spoke with pt informed him that he has a RX waiting for . PT stated that he will come in and  today

## 2019-05-09 DIAGNOSIS — N52.2 DRUG-INDUCED ERECTILE DYSFUNCTION: ICD-10-CM

## 2019-05-11 ENCOUNTER — TELEPHONE (OUTPATIENT)
Dept: INTERNAL MEDICINE | Facility: CLINIC | Age: 44
End: 2019-05-11

## 2019-05-11 RX ORDER — SILDENAFIL CITRATE 20 MG/1
TABLET ORAL
Qty: 40 TABLET | Refills: 1 | Status: SHIPPED | OUTPATIENT
Start: 2019-05-11 | End: 2019-06-12 | Stop reason: SDUPTHER

## 2019-05-11 NOTE — TELEPHONE ENCOUNTER
Faxed notes and PA paperwork to [employer] for PA for testosterone for pt, Have submitted several PA's without getting final decision.

## 2019-05-13 ENCOUNTER — TELEPHONE (OUTPATIENT)
Dept: INTERNAL MEDICINE | Facility: CLINIC | Age: 44
End: 2019-05-13

## 2019-05-17 ENCOUNTER — PRIOR AUTHORIZATION (OUTPATIENT)
Dept: INTERNAL MEDICINE | Facility: CLINIC | Age: 44
End: 2019-05-17

## 2019-05-17 ENCOUNTER — TELEPHONE (OUTPATIENT)
Dept: INTERNAL MEDICINE | Facility: CLINIC | Age: 44
End: 2019-05-17

## 2019-05-17 NOTE — TELEPHONE ENCOUNTER
PT notified that peer to peer complete regarding Testosterone. Approved pt may fill on 5/22/19. Voiced understanding and appreciation

## 2019-05-24 DIAGNOSIS — N52.2 DRUG-INDUCED ERECTILE DYSFUNCTION: ICD-10-CM

## 2019-05-24 RX ORDER — SILDENAFIL CITRATE 20 MG/1
TABLET ORAL
Qty: 40 TABLET | Refills: 1 | Status: CANCELLED | OUTPATIENT
Start: 2019-05-24

## 2019-05-30 ENCOUNTER — TELEPHONE (OUTPATIENT)
Dept: INTERNAL MEDICINE | Facility: CLINIC | Age: 44
End: 2019-05-30

## 2019-06-03 NOTE — TELEPHONE ENCOUNTER
Please let patient know we have tried to get it PA'd.  It has been denied.  He will need to contact his insurance company himself

## 2019-06-10 DIAGNOSIS — N52.2 DRUG-INDUCED ERECTILE DYSFUNCTION: ICD-10-CM

## 2019-06-10 DIAGNOSIS — R79.89 LOW TESTOSTERONE IN MALE: ICD-10-CM

## 2019-06-10 RX ORDER — SILDENAFIL CITRATE 20 MG/1
TABLET ORAL
Qty: 40 TABLET | Refills: 1 | Status: CANCELLED | OUTPATIENT
Start: 2019-06-10

## 2019-06-10 RX ORDER — TESTOSTERONE 16.2 MG/G
4 GEL TRANSDERMAL DAILY
Qty: 150 G | Refills: 2 | Status: CANCELLED | OUTPATIENT
Start: 2019-06-10

## 2019-06-10 NOTE — TELEPHONE ENCOUNTER
Pt needs refills, Sildenafil needs to be sent to [facility] and Testosterone to [facility], Pt will need 90 day supply per insurance.

## 2019-06-10 NOTE — TELEPHONE ENCOUNTER
SWITCHING THE SILDENAFIL PRESCRIPTION TO [facility] IN [place]. PT STATES THAT ITS CHEAPER SO HE WOULD PREFER [facility] WHEN REFILLING HIS SILDENAFIL. PT#:[phone]

## 2019-06-12 ENCOUNTER — TELEPHONE (OUTPATIENT)
Dept: INTERNAL MEDICINE | Facility: CLINIC | Age: 44
End: 2019-06-12

## 2019-06-12 DIAGNOSIS — N52.2 DRUG-INDUCED ERECTILE DYSFUNCTION: ICD-10-CM

## 2019-06-12 DIAGNOSIS — R79.89 LOW TESTOSTERONE IN MALE: ICD-10-CM

## 2019-06-12 RX ORDER — SILDENAFIL CITRATE 20 MG/1
TABLET ORAL
Qty: 40 TABLET | Refills: 1 | Status: SHIPPED | OUTPATIENT
Start: 2019-06-12 | End: 2019-08-26 | Stop reason: SDUPTHER

## 2019-06-12 RX ORDER — TESTOSTERONE 16.2 MG/G
4 GEL TRANSDERMAL DAILY
Qty: 150 G | Refills: 0 | Status: SHIPPED | OUTPATIENT
Start: 2019-06-12 | End: 2019-07-22 | Stop reason: SDUPTHER

## 2019-07-22 ENCOUNTER — TELEPHONE (OUTPATIENT)
Dept: INTERNAL MEDICINE | Facility: CLINIC | Age: 44
End: 2019-07-22

## 2019-07-22 DIAGNOSIS — R79.89 LOW TESTOSTERONE IN MALE: ICD-10-CM

## 2019-07-22 RX ORDER — TESTOSTERONE 16.2 MG/G
4 GEL TRANSDERMAL DAILY
Qty: 150 G | Refills: 0 | Status: CANCELLED | OUTPATIENT
Start: 2019-07-22

## 2019-07-22 RX ORDER — TESTOSTERONE 16.2 MG/G
4 GEL TRANSDERMAL DAILY
Qty: 150 G | Refills: 0 | OUTPATIENT
Start: 2019-07-22 | End: 2019-08-26 | Stop reason: SDUPTHER

## 2019-07-24 DIAGNOSIS — R79.89 LOW TESTOSTERONE IN MALE: ICD-10-CM

## 2019-07-25 RX ORDER — TESTOSTERONE 16.2 MG/G
GEL TRANSDERMAL
Qty: 150 G | Refills: 0 | OUTPATIENT
Start: 2019-07-25

## 2019-07-25 NOTE — TELEPHONE ENCOUNTER
I spoke to [facility] in [place] to see if they got Whit's phone in on 07/22/19 and they stated that the last refill they had for this patient for Androgel was in April. Oked a 1 month refill for pt since Juan José' from 7/22/19 was not received. 0 refills. Pt aware that he needs an appointment and a repeat testosterone level done for any additional refills. Pt verbalized understanding and will make an appointment when he gets back from being out of town next week.

## 2019-08-21 ENCOUNTER — OFFICE VISIT (OUTPATIENT)
Dept: INTERNAL MEDICINE | Facility: CLINIC | Age: 44
End: 2019-08-21

## 2019-08-21 VITALS
DIASTOLIC BLOOD PRESSURE: 92 MMHG | BODY MASS INDEX: 29.42 KG/M2 | HEIGHT: 73 IN | TEMPERATURE: 98.5 F | WEIGHT: 222 LBS | HEART RATE: 96 BPM | OXYGEN SATURATION: 98 % | RESPIRATION RATE: 18 BRPM | SYSTOLIC BLOOD PRESSURE: 138 MMHG

## 2019-08-21 DIAGNOSIS — I10 ESSENTIAL HYPERTENSION: ICD-10-CM

## 2019-08-21 DIAGNOSIS — R79.89 LOW TESTOSTERONE IN MALE: ICD-10-CM

## 2019-08-21 DIAGNOSIS — Z00.00 HEALTHCARE MAINTENANCE: Primary | ICD-10-CM

## 2019-08-21 PROCEDURE — 99212 OFFICE O/P EST SF 10 MIN: CPT | Performed by: NURSE PRACTITIONER

## 2019-08-21 PROCEDURE — 99396 PREV VISIT EST AGE 40-64: CPT | Performed by: NURSE PRACTITIONER

## 2019-08-21 RX ORDER — LISINOPRIL 10 MG/1
10 TABLET ORAL DAILY
Qty: 30 TABLET | Refills: 2 | Status: SHIPPED | OUTPATIENT
Start: 2019-08-21 | End: 2020-01-31 | Stop reason: SDUPTHER

## 2019-08-21 NOTE — PROGRESS NOTES
"Subjective   Taras Phipps is a 43 y.o. male.   Chief Complaint   Patient presents with   • Depression   • Med Refill   • Annual Exam      History of Present Illness  Making lifestyle changes to help control  BP.  Sometimes will have left arm muscle pain (rare) after working as .  Patient denies fever chills, headache, ear pain, sore throat, shortness of air, cough, wheezing, chest pain, abdominal pain, nausea, vomiting, diarrhea, dysuria, blood in stool or urine.  Mood is stressed but not really depressed.  Does not want to be treated.  Eating and drinking as usual.  No unexplained weight loss or gain.  Revatio is working well to help with ED.  Wants Testosterone for 90 day        Social:  .  Non smoker, no chewing.  Drinks socially.  No recreational drugs.  Uses CBD oil occ.  Self employed .    The following portions of the patient's history were reviewed and updated as appropriate: allergies, current medications, past family history, past medical history, past social history, past surgical history and problem list.    Current Outpatient Medications:   •  ANDROGEL PUMP 20.25 MG/ACT (1.62%) gel, Apply 4 Pump topically Daily., Disp: 150 g, Rfl: 0  •  sildenafil (REVATIO) 20 MG tablet, Use 1-3 tablets as needed PT intercourse, Disp: 40 tablet, Rfl: 1  •  ZUBSOLV 5.7-1.4 MG sublingual tablet, , Disp: , Rfl:   •  lisinopril (PRINIVIL,ZESTRIL) 10 MG tablet, Take 1 tablet by mouth Daily., Disp: 30 tablet, Rfl: 2    Review of Systems Consitutional, HEENT, Respiratory, CV, GI, , Skin, Musculoskeletal, Neuro-mental, Endocrinological, Hematological were reviewed.  Positives were discussed in the HPI, otherwise ROS was negative   /92   Pulse 96   Temp 98.5 °F (36.9 °C)   Resp 18   Ht 185.4 cm (73\")   Wt 101 kg (222 lb)   SpO2 98%   BMI 29.29 kg/m²     Objective   No Known Allergies    Physical Exam   Constitutional: He is oriented to person, place, and time. He appears " well-developed and well-nourished. No distress.   HENT:   Head: Normocephalic.   Right Ear: External ear normal.   Left Ear: External ear normal.   Nose: Nose normal.   Mouth/Throat: Oropharynx is clear and moist.   TMs are clear   Eyes: Right eye exhibits no discharge. Left eye exhibits no discharge. No scleral icterus.   Neck: Neck supple. No thyromegaly present.   No carotid bruit bilat   Cardiovascular: Normal rate, regular rhythm, normal heart sounds and intact distal pulses. Exam reveals no gallop and no friction rub.   No murmur heard.  Pulmonary/Chest: Effort normal and breath sounds normal. No stridor. No respiratory distress. He has no wheezes. He has no rales.   Abdominal: Soft. Bowel sounds are normal. He exhibits no mass. There is no tenderness.   No HSM   Musculoskeletal:   SEWELL well.  Gait upright and steady    Lymphadenopathy:     He has no cervical adenopathy.   Neurological: He is alert and oriented to person, place, and time.   Skin: Skin is warm and dry. Capillary refill takes less than 2 seconds.   Is pink, no rash    Psychiatric: He has a normal mood and affect. His behavior is normal. Judgment and thought content normal.   Nursing note and vitals reviewed.      Procedures    LABS  Results for orders placed or performed in visit on 08/21/19   Comprehensive Metabolic Panel   Result Value Ref Range    Glucose 98 65 - 99 mg/dL    BUN 16 6 - 20 mg/dL    Creatinine 1.19 0.76 - 1.27 mg/dL    eGFR Non African Am 67 >60 mL/min/1.73    eGFR African Am 81 >60 mL/min/1.73    BUN/Creatinine Ratio 13.4 7.0 - 25.0    Sodium 143 136 - 145 mmol/L    Potassium 5.2 3.5 - 5.2 mmol/L    Chloride 102 98 - 107 mmol/L    Total CO2 28.8 22.0 - 29.0 mmol/L    Calcium 9.5 8.6 - 10.5 mg/dL    Total Protein 7.2 6.0 - 8.5 g/dL    Albumin 4.90 3.50 - 5.20 g/dL    Globulin 2.3 gm/dL    A/G Ratio 2.1 g/dL    Total Bilirubin 0.6 0.2 - 1.2 mg/dL    Alkaline Phosphatase 61 39 - 117 U/L    AST (SGOT) 19 1 - 40 U/L    ALT (SGPT) 25  "1 - 41 U/L       Assessment/Plan   Taras was seen today for depression, med refill and annual exam.    Diagnoses and all orders for this visit:    Healthcare maintenance  -     Comprehensive Metabolic Panel    Essential hypertension  -     lisinopril (PRINIVIL,ZESTRIL) 10 MG tablet; Take 1 tablet by mouth Daily.    Low testosterone in male  -     Testosterone; Future        Patient Instructions   Labs as discussed.  I did not order a lipid panel as he is not fasting.  New diagnosis of essential hypertension.  He has had 3 consecutive readings with diastolic above 90.  His systolic has been elevated to 160.  We will begin patient on lisinopril 10 mg daily.  He is to avoid sodium, stimulants.  Health Education:  Heart healthy diet to include fresh fruits and vegetables.  Limit intake of red meats.  Increase chicken and fish in diet.  Avoid fried greasy foods.  Daily activity working up 30 minutes of brisk exercise daily.  Adequate sleep and \"down time\".  Recommend hepatitis A vaccine and annual flu vaccine.  Pt verbalizes understanding and agreement with plan of care.     August 27, 2019.  For whatever reason the testosterone level that was ordered in April 2019 did not get done.  We will need to obtain at next visit.      ANALISA Overton   "

## 2019-08-22 ENCOUNTER — TELEPHONE (OUTPATIENT)
Dept: INTERNAL MEDICINE | Facility: CLINIC | Age: 44
End: 2019-08-22

## 2019-08-22 PROBLEM — N52.9 ERECTILE DYSFUNCTION: Status: ACTIVE | Noted: 2018-06-11

## 2019-08-22 PROBLEM — I10 ESSENTIAL HYPERTENSION: Status: ACTIVE | Noted: 2019-08-22

## 2019-08-22 LAB
ALBUMIN SERPL-MCNC: 4.9 G/DL (ref 3.5–5.2)
ALBUMIN/GLOB SERPL: 2.1 G/DL
ALP SERPL-CCNC: 61 U/L (ref 39–117)
ALT SERPL-CCNC: 25 U/L (ref 1–41)
AST SERPL-CCNC: 19 U/L (ref 1–40)
BILIRUB SERPL-MCNC: 0.6 MG/DL (ref 0.2–1.2)
BUN SERPL-MCNC: 16 MG/DL (ref 6–20)
BUN/CREAT SERPL: 13.4 (ref 7–25)
CALCIUM SERPL-MCNC: 9.5 MG/DL (ref 8.6–10.5)
CHLORIDE SERPL-SCNC: 102 MMOL/L (ref 98–107)
CO2 SERPL-SCNC: 28.8 MMOL/L (ref 22–29)
CREAT SERPL-MCNC: 1.19 MG/DL (ref 0.76–1.27)
GLOBULIN SER CALC-MCNC: 2.3 GM/DL
GLUCOSE SERPL-MCNC: 98 MG/DL (ref 65–99)
POTASSIUM SERPL-SCNC: 5.2 MMOL/L (ref 3.5–5.2)
PROT SERPL-MCNC: 7.2 G/DL (ref 6–8.5)
SODIUM SERPL-SCNC: 143 MMOL/L (ref 136–145)

## 2019-08-22 NOTE — TELEPHONE ENCOUNTER
Informed pt of lab results, pt voiced understanding.    ----- Message from ANALISA Son sent at 8/22/2019  8:03 AM EDT -----  Please call patient random sugar was normal at 98.  Your kidney function, potassium, and liver function test are normal.  That is good news.

## 2019-08-22 NOTE — PATIENT INSTRUCTIONS
"Labs as discussed.  I did not order a lipid panel as he is not fasting.  New diagnosis of essential hypertension.  He has had 3 consecutive readings with diastolic above 90.  His systolic has been elevated to 160.  We will begin patient on lisinopril 10 mg daily.  He is to avoid sodium, stimulants.  Health Education:  Heart healthy diet to include fresh fruits and vegetables.  Limit intake of red meats.  Increase chicken and fish in diet.  Avoid fried greasy foods.  Daily activity working up 30 minutes of brisk exercise daily.  Adequate sleep and \"down time\".  Recommend hepatitis A vaccine and annual flu vaccine.  Pt verbalizes understanding and agreement with plan of care.     August 27, 2019.  For whatever reason the testosterone level that was ordered in April 2019 did not get done.  We will need to obtain at next visit.  "

## 2019-08-26 ENCOUNTER — TELEPHONE (OUTPATIENT)
Dept: INTERNAL MEDICINE | Facility: CLINIC | Age: 44
End: 2019-08-26

## 2019-08-26 DIAGNOSIS — N52.2 DRUG-INDUCED ERECTILE DYSFUNCTION: ICD-10-CM

## 2019-08-26 DIAGNOSIS — I10 ESSENTIAL HYPERTENSION: ICD-10-CM

## 2019-08-26 DIAGNOSIS — R79.89 LOW TESTOSTERONE IN MALE: ICD-10-CM

## 2019-08-26 RX ORDER — SILDENAFIL CITRATE 20 MG/1
TABLET ORAL
Qty: 40 TABLET | Refills: 1 | Status: CANCELLED | OUTPATIENT
Start: 2019-08-26

## 2019-08-26 RX ORDER — LISINOPRIL 10 MG/1
10 TABLET ORAL DAILY
Qty: 30 TABLET | Refills: 2 | Status: CANCELLED | OUTPATIENT
Start: 2019-08-26

## 2019-08-26 RX ORDER — TESTOSTERONE 16.2 MG/G
4 GEL TRANSDERMAL DAILY
Qty: 150 G | Refills: 0 | OUTPATIENT
Start: 2019-08-26 | End: 2019-10-01 | Stop reason: SDUPTHER

## 2019-08-26 RX ORDER — SILDENAFIL CITRATE 20 MG/1
TABLET ORAL
Qty: 40 TABLET | Refills: 1 | Status: SHIPPED | OUTPATIENT
Start: 2019-08-26 | End: 2019-10-01 | Stop reason: SDUPTHER

## 2019-08-26 NOTE — TELEPHONE ENCOUNTER
Called in Androgel to [facility] pharmacy on [address], 150 grams     0 rf, per Whit

## 2019-08-27 DIAGNOSIS — R79.89 LOW TESTOSTERONE IN MALE: ICD-10-CM

## 2019-08-27 RX ORDER — TESTOSTERONE 16.2 MG/G
GEL TRANSDERMAL
Refills: 0 | OUTPATIENT
Start: 2019-08-27

## 2019-08-28 ENCOUNTER — PRIOR AUTHORIZATION (OUTPATIENT)
Dept: INTERNAL MEDICINE | Facility: CLINIC | Age: 44
End: 2019-08-28

## 2019-08-29 ENCOUNTER — PRIOR AUTHORIZATION (OUTPATIENT)
Dept: INTERNAL MEDICINE | Facility: CLINIC | Age: 44
End: 2019-08-29

## 2019-09-30 ENCOUNTER — TELEPHONE (OUTPATIENT)
Dept: INTERNAL MEDICINE | Facility: CLINIC | Age: 44
End: 2019-09-30

## 2019-09-30 DIAGNOSIS — R79.89 LOW TESTOSTERONE IN MALE: ICD-10-CM

## 2019-09-30 RX ORDER — TESTOSTERONE 16.2 MG/G
GEL TRANSDERMAL
Refills: 0 | Status: CANCELLED | OUTPATIENT
Start: 2019-09-30

## 2019-09-30 NOTE — TELEPHONE ENCOUNTER
PATIENT CALLED TO REQUEST REFILL OF ANDROGEL AND SILDENAFIL. PLEASE SEND TO [facility].

## 2019-10-01 ENCOUNTER — TELEPHONE (OUTPATIENT)
Dept: INTERNAL MEDICINE | Facility: CLINIC | Age: 44
End: 2019-10-01

## 2019-10-01 DIAGNOSIS — N52.2 DRUG-INDUCED ERECTILE DYSFUNCTION: ICD-10-CM

## 2019-10-01 DIAGNOSIS — R79.89 LOW TESTOSTERONE IN MALE: ICD-10-CM

## 2019-10-01 RX ORDER — SILDENAFIL CITRATE 20 MG/1
TABLET ORAL
Qty: 40 TABLET | Refills: 0 | Status: SHIPPED | OUTPATIENT
Start: 2019-10-01 | End: 2019-11-06 | Stop reason: SDUPTHER

## 2019-10-01 RX ORDER — TESTOSTERONE 16.2 MG/G
4 GEL TRANSDERMAL DAILY
Qty: 150 G | Refills: 0 | OUTPATIENT
Start: 2019-10-01 | End: 2019-10-30 | Stop reason: SDUPTHER

## 2019-10-28 ENCOUNTER — OFFICE VISIT (OUTPATIENT)
Dept: INTERNAL MEDICINE | Facility: CLINIC | Age: 44
End: 2019-10-28

## 2019-10-28 VITALS
DIASTOLIC BLOOD PRESSURE: 84 MMHG | SYSTOLIC BLOOD PRESSURE: 140 MMHG | OXYGEN SATURATION: 98 % | HEIGHT: 73 IN | BODY MASS INDEX: 30.09 KG/M2 | RESPIRATION RATE: 16 BRPM | WEIGHT: 227 LBS | HEART RATE: 80 BPM

## 2019-10-28 DIAGNOSIS — Z86.59 HISTORY OF DEPRESSION: ICD-10-CM

## 2019-10-28 DIAGNOSIS — N52.2 DRUG-INDUCED ERECTILE DYSFUNCTION: ICD-10-CM

## 2019-10-28 DIAGNOSIS — Z13.1 SCREENING FOR DIABETES MELLITUS: ICD-10-CM

## 2019-10-28 DIAGNOSIS — Z00.00 HEALTHCARE MAINTENANCE: ICD-10-CM

## 2019-10-28 DIAGNOSIS — Z23 NEED FOR VACCINATION: ICD-10-CM

## 2019-10-28 DIAGNOSIS — F19.11 HISTORY OF SUBSTANCE ABUSE (HCC): ICD-10-CM

## 2019-10-28 DIAGNOSIS — I10 ESSENTIAL HYPERTENSION: ICD-10-CM

## 2019-10-28 DIAGNOSIS — Z13.29 SCREENING FOR THYROID DISORDER: ICD-10-CM

## 2019-10-28 DIAGNOSIS — Z13.220 SCREENING, LIPID: ICD-10-CM

## 2019-10-28 DIAGNOSIS — R79.89 LOW TESTOSTERONE IN MALE: Primary | ICD-10-CM

## 2019-10-28 PROCEDURE — 99204 OFFICE O/P NEW MOD 45 MIN: CPT | Performed by: INTERNAL MEDICINE

## 2019-10-28 PROCEDURE — 90471 IMMUNIZATION ADMIN: CPT | Performed by: INTERNAL MEDICINE

## 2019-10-28 PROCEDURE — 90686 IIV4 VACC NO PRSV 0.5 ML IM: CPT | Performed by: INTERNAL MEDICINE

## 2019-10-28 NOTE — PROGRESS NOTES
Internal Medicine New Patient  Taras Phipps is a 44 y.o. male who presents today to establish care and with concerns as outlined below.    Chief Complaint  Chief Complaint   Patient presents with   • Establish Care     Refills, change of medication   • low testosterone   • Erectile Dysfunction   • Hypertension        HPI  Mr. Phipps presents to establish care. He previously saw Whit. His main concern is that his medications have been difficult to refill. He requests 90 days supplies due to difficulty getting his medications refilled.    He was started on lisinopril at his last visit at the end of August. He reports checking at home and getting 125/90 on average. Today he did not take his medication before coming in.    He takes Suboxone prescribed by Dr. Escalona for prior addiction to pain pills. Has been sober for several years. In addition to MAT he attends monthly group meetings held by his suboxone clinic.     He requests trial of cialis rather than sildenafil. He reports that sildenafil is effective but he does not like that he has to take it on an empty stomach as per the directions he was given. If cialis is covered by insurance he would prefer to take this.    He has a history of depression, anxiety. He reports stable mood with infrequent episodes of feeling down. He has 5 kids and can get overwhelmed at times. He describes himself as being typically upbeat. He denies SI/HI. He has tried several medications in the past but stops due to side effects. He gets benefit from his monthly group meetings for his OUD.         Review of Systems  Review of Systems   Constitutional: Negative.    HENT: Negative.    Eyes: Negative for visual disturbance.   Respiratory: Negative.    Cardiovascular: Negative.    Gastrointestinal: Negative.    Genitourinary: Positive for erectile dysfunction. Negative for difficulty urinating.   Musculoskeletal: Negative.    Skin: Negative.    Neurological: Negative for headache.    Psychiatric/Behavioral: Positive for depressed mood (infrequently).        Past Medical History  Past Medical History:   Diagnosis Date   • Depression         Surgical History  Past Surgical History:   Procedure Laterality Date   • EYELID LACERATION REPAIR      skin cancer   • TONSILLECTOMY          Family History  Family History   Problem Relation Age of Onset   • Mental illness Mother    • Breast cancer Mother 40   • Alcohol abuse Mother         passed at 44   • Mental illness Father    • Alcohol abuse Father    • Liver disease Father    • Obesity Maternal Uncle    • Diabetes Maternal Uncle    • Arthritis Maternal Grandmother    • Hypertension Maternal Grandmother    • Heart attack Maternal Grandfather    • No Known Problems Sister    • Heart disease Paternal Grandmother         needed ppm in 70s   • Heart disease Paternal Grandfather         needed ppm in 70s   • Crohn's disease Maternal Aunt    • Throat cancer Paternal Aunt    • Thyroid disease Paternal Aunt    • Colon cancer Neg Hx         Social History  Social History     Socioeconomic History   • Marital status:      Spouse name: Not on file   • Number of children: Not on file   • Years of education: Not on file   • Highest education level: Not on file   Tobacco Use   • Smoking status: Never Smoker   • Smokeless tobacco: Never Used   Substance and Sexual Activity   • Alcohol use: Yes     Frequency: 2-3 times a week     Comment: socially, typically on weekends. Described as excessive on occasion but unable to quantify.   • Drug use: No     Comment: past addiction to pain pills, quit [id number]   • Sexual activity: Defer        Current Medications  Current Outpatient Medications on File Prior to Visit   Medication Sig Dispense Refill   • ANDROGEL PUMP 20.25 MG/ACT (1.62%) gel Apply 4 Pump topically Daily. 150 g 0   • lisinopril (PRINIVIL,ZESTRIL) 10 MG tablet Take 1 tablet by mouth Daily. 30 tablet 2   • sildenafil (REVATIO) 20 MG tablet Use 1-3  "tablets as needed PT intercourse 40 tablet 0   • ZUBSOLV 5.7-1.4 MG sublingual tablet        No current facility-administered medications on file prior to visit.        Allergies  No Known Allergies     Objective  Visit Vitals  /84   Pulse 80   Resp 16   Ht 185.4 cm (72.99\")   Wt 103 kg (227 lb)   SpO2 98%   BMI 29.96 kg/m²        Physical Exam  Physical Exam   Constitutional: He is oriented to person, place, and time. He appears well-developed and well-nourished. No distress.   HENT:   Head: Normocephalic and atraumatic.   Right Ear: External ear normal.   Left Ear: External ear normal.   Nose: Nose normal.   Mouth/Throat: Oropharynx is clear and moist. No oropharyngeal exudate.   Eyes: Conjunctivae and EOM are normal. Pupils are equal, round, and reactive to light. No scleral icterus.   Neck: Neck supple. No thyromegaly present.   Cardiovascular: Normal rate, regular rhythm, normal heart sounds and intact distal pulses.   No murmur heard.  Pulmonary/Chest: Effort normal and breath sounds normal. No respiratory distress.   Abdominal: Soft. Bowel sounds are normal. He exhibits no distension. There is no tenderness.   Musculoskeletal: He exhibits no edema or deformity.   Lymphadenopathy:     He has no cervical adenopathy.   Neurological: He is alert and oriented to person, place, and time.   Skin: Skin is warm and dry. No rash noted. He is not diaphoretic.   Psychiatric: He has a normal mood and affect. His behavior is normal. Judgment and thought content normal.   Nursing note and vitals reviewed.       Results  Results for orders placed or performed in visit on 08/21/19   Comprehensive Metabolic Panel   Result Value Ref Range    Glucose 98 65 - 99 mg/dL    BUN 16 6 - 20 mg/dL    Creatinine 1.19 0.76 - 1.27 mg/dL    eGFR Non African Am 67 >60 mL/min/1.73    eGFR African Am 81 >60 mL/min/1.73    BUN/Creatinine Ratio 13.4 7.0 - 25.0    Sodium 143 136 - 145 mmol/L    Potassium 5.2 3.5 - 5.2 mmol/L    Chloride 102 " 98 - 107 mmol/L    Total CO2 28.8 22.0 - 29.0 mmol/L    Calcium 9.5 8.6 - 10.5 mg/dL    Total Protein 7.2 6.0 - 8.5 g/dL    Albumin 4.90 3.50 - 5.20 g/dL    Globulin 2.3 gm/dL    A/G Ratio 2.1 g/dL    Total Bilirubin 0.6 0.2 - 1.2 mg/dL    Alkaline Phosphatase 61 39 - 117 U/L    AST (SGOT) 19 1 - 40 U/L    ALT (SGPT) 25 1 - 41 U/L        Assessment and Plan  Taras was seen today for establish care, low testosterone, erectile dysfunction and hypertension.    Diagnoses and all orders for this visit:    Low testosterone in male and Drug-induced erectile dysfunction  - Reports longstanding low testosterone with initial level checked for fatigue, also has ED  - Both may be secondary to chronic suboxone use  - Reports improvement in fatigue with androgel and ED with sildenafil  - Denies common side effects including difficulty urinating, headache, dizziness, application site irritation  - Does have HTN that will need to be monitored  - Will check testosterone level, CBC today. Recent CMP nl.  - If testosterone level appropriate will refill topical testosterone  - Will send cialis to pharmacy per patient request    Essential hypertension  - Recently started on lisinopril and he reports BP at goal when checked at home.  - Did not take medications before coming in this morning and BP above goal  - BMP today to monitor effect on renal function and potassium  - Continue lisinopril 10mg  - If difficult to control may need to dc testosterone    History of substance abuse (CMS/HCC)  - Stable on suboxone, follows with Dr. Escalona  - Also attends monthly meetings    History of depression  - Reports mood is stable with infrequent depression, no SI  - Declines treatment or referral today, has failed many lines of therapy in the past    Screening, lipid  - Lipid panel ordered    Screening for diabetes mellitus  - A1c ordered    Screening for thyroid disorder  - TSH ordered    Need for vaccination  -     Flucelvax Quad=>4Years  ([id number])    Health Maintenance   Topic Date Due   • TDAP/TD VACCINES (1 - Tdap) 08/21/2020 (Originally 9/29/1995)   • ANNUAL PHYSICAL  08/22/2020   • INFLUENZA VACCINE  Completed     Health Maintenance  - Colonoscopy: At age 51.  - Immunizations: Flu given today. Discuss tdap at next visit.  - Depression screening: As above.    Return in about 3 months (around 1/28/2020) for Follow up low testosterone, ED.

## 2019-10-30 DIAGNOSIS — R79.89 LOW TESTOSTERONE IN MALE: ICD-10-CM

## 2019-10-31 RX ORDER — TESTOSTERONE 16.2 MG/G
GEL TRANSDERMAL
Qty: 150 G | Refills: 2 | Status: SHIPPED | OUTPATIENT
Start: 2019-10-31 | End: 2020-01-31 | Stop reason: SDUPTHER

## 2019-11-04 ENCOUNTER — TELEPHONE (OUTPATIENT)
Dept: INTERNAL MEDICINE | Facility: CLINIC | Age: 44
End: 2019-11-04

## 2019-11-04 NOTE — TELEPHONE ENCOUNTER
Pt calling because 2 of the 3 medications that he spoke with Dr Hernandez during his appointment were not called in to his pharmacy. Also, the other prescription, he requested a 3 month but only a 1 month was called in.  He is requesting a call back at [phone]

## 2019-11-05 NOTE — TELEPHONE ENCOUNTER
Called PT to see which scripts he needed. He needs his Testerone  to be for 90 days instead of 30 because of insurance changing the way it is taken care of in the coming year. PT also wants us to call in Sildenafil along with the Cialis (which he prefers) to see which one cost less. I have looked and PT chart and Lisinopril has not been called in. I will john the Lisinopril in. Please advise on other.

## 2019-11-05 NOTE — TELEPHONE ENCOUNTER
Called Pharmacy and PT has picked up the 30 day supply. They will talk to him about the 90 day when he comes into the pharmacy next time. The pharmacy also stated that he needs a new script for the Sildenafil and to call in the refill for Cialis and Pt can pick whichever one he chooses. Called Pt and discussed this PT voiced understanding.

## 2019-11-05 NOTE — TELEPHONE ENCOUNTER
The testosterone was sent with refills. The pharmacy can give a 90 day supply rather than 30 with refills. Please discuss this with his pharmacy. Also please discuss the cost of cialis and sildenafil with the pharmacy. I will send in whichever is cheaper.

## 2019-11-06 DIAGNOSIS — N52.2 DRUG-INDUCED ERECTILE DYSFUNCTION: Primary | ICD-10-CM

## 2019-11-06 RX ORDER — SILDENAFIL CITRATE 20 MG/1
TABLET ORAL
Qty: 40 TABLET | Refills: 0 | Status: SHIPPED | OUTPATIENT
Start: 2019-11-06 | End: 2020-01-31

## 2019-11-06 RX ORDER — TADALAFIL 10 MG/1
10 TABLET ORAL DAILY PRN
Qty: 30 TABLET | Refills: 0 | Status: SHIPPED | OUTPATIENT
Start: 2019-11-06 | End: 2019-11-29 | Stop reason: SDUPTHER

## 2019-11-06 NOTE — TELEPHONE ENCOUNTER
Both sildenafil and cialis have been sent to the pharmacy. The pharmacist was called and notified that only one was to be filled and the other discarded based on patient preference. Please let Mr. Phipps know they are at his [facility] pharmacy.

## 2019-11-06 NOTE — TELEPHONE ENCOUNTER
Called PT let him know about scripts being called in and he can choose whichever he wants. PT voiced understanding.

## 2019-11-29 DIAGNOSIS — N52.2 DRUG-INDUCED ERECTILE DYSFUNCTION: ICD-10-CM

## 2019-11-30 RX ORDER — TADALAFIL 10 MG/1
TABLET ORAL
Qty: 30 TABLET | Refills: 0 | Status: SHIPPED | OUTPATIENT
Start: 2019-11-30 | End: 2020-01-31 | Stop reason: SDUPTHER

## 2020-01-31 ENCOUNTER — OFFICE VISIT (OUTPATIENT)
Dept: INTERNAL MEDICINE | Facility: CLINIC | Age: 45
End: 2020-01-31

## 2020-01-31 VITALS
DIASTOLIC BLOOD PRESSURE: 92 MMHG | RESPIRATION RATE: 16 BRPM | HEART RATE: 82 BPM | SYSTOLIC BLOOD PRESSURE: 148 MMHG | WEIGHT: 227.6 LBS | BODY MASS INDEX: 30.17 KG/M2 | HEIGHT: 73 IN | OXYGEN SATURATION: 97 % | TEMPERATURE: 98.3 F

## 2020-01-31 DIAGNOSIS — R79.89 LOW TESTOSTERONE IN MALE: ICD-10-CM

## 2020-01-31 DIAGNOSIS — I10 ESSENTIAL HYPERTENSION: ICD-10-CM

## 2020-01-31 DIAGNOSIS — N52.2 DRUG-INDUCED ERECTILE DYSFUNCTION: ICD-10-CM

## 2020-01-31 PROCEDURE — 99214 OFFICE O/P EST MOD 30 MIN: CPT | Performed by: INTERNAL MEDICINE

## 2020-01-31 RX ORDER — TADALAFIL 10 MG/1
10 TABLET ORAL DAILY PRN
Qty: 30 TABLET | Refills: 1 | Status: SHIPPED | OUTPATIENT
Start: 2020-01-31 | End: 2020-04-30 | Stop reason: SDUPTHER

## 2020-01-31 RX ORDER — TESTOSTERONE 16.2 MG/G
4 GEL TRANSDERMAL DAILY
Qty: 450 G | Refills: 0 | Status: CANCELLED | OUTPATIENT
Start: 2020-01-31

## 2020-01-31 RX ORDER — TESTOSTERONE 16.2 MG/G
4 GEL TRANSDERMAL DAILY
Qty: 450 G | Refills: 0 | Status: SHIPPED | OUTPATIENT
Start: 2020-01-31 | End: 2020-04-30 | Stop reason: SDUPTHER

## 2020-01-31 RX ORDER — LISINOPRIL 10 MG/1
10 TABLET ORAL DAILY
Qty: 30 TABLET | Refills: 2 | Status: SHIPPED | OUTPATIENT
Start: 2020-01-31 | End: 2020-04-30 | Stop reason: SDUPTHER

## 2020-01-31 NOTE — PROGRESS NOTES
Internal Medicine Follow Up    Chief Complaint  Taras Phipps is a 44 y.o. male who presents today for follow up of chronic medical conditions outlined below.    Chief Complaint   Patient presents with   • Erectile Dysfunction     Low Testosterone, 3 month follow up        HPI  Mr. Phipps comes in for scheduled follow up. He is doing well. No changes since his last visit. He found that the cialis did work better than sildenafil so he has been using it and no longer using sildenafil. He continues to use testosterone gel daily with no concerns. He denies difficulty urinating, chest pain, SOA, vision changes, or local skin irritation. He requests 90 day supply sent to his pharmacy due to difficulty with getting it filled each month that he attributes to pharmacy stocking. He reports that he has been without lisinopril for some time but that his BP is generally okay when checked every 2 weeks at his suboxone clinic. He does not bring readings with him.       Review of Systems  Review of Systems   Constitutional: Negative.    Eyes: Negative for visual disturbance.   Respiratory: Negative.    Cardiovascular: Negative.    Genitourinary: Positive for erectile dysfunction. Negative for decreased urine volume and difficulty urinating.   Skin: Negative for rash.        Current Medications  Current Outpatient Medications on File Prior to Visit   Medication Sig Dispense Refill   • ZUBSOLV 5.7-1.4 MG sublingual tablet      • [DISCONTINUED] lisinopril (PRINIVIL,ZESTRIL) 10 MG tablet Take 1 tablet by mouth Daily. 30 tablet 2   • [DISCONTINUED] sildenafil (REVATIO) 20 MG tablet Use 1-3 tablets as needed PT intercourse 40 tablet 0   • [DISCONTINUED] tadalafil (CIALIS) 10 MG tablet TAKE ONE TABLET BY MOUTH DAILY 30 MINUTES BEFORE SEXUAL ACTIVITY AS NEEDED FOR ERECTILE DYSFUNCTION *DO NOT TAKE MORE THAN ONCE DAILY* 30 tablet 0   • [DISCONTINUED] Testosterone 20.25 MG/ACT (1.62%) gel APPLY 4 PUMP ACTUATIONS TOPICALLY ONCE DAILY IN THE  "MORNING TO THE SHOULDERS AND UPPER ARMS 150 g 2     No current facility-administered medications on file prior to visit.        Allergies  No Known Allergies    Objective  Visit Vitals  /92   Pulse 82   Temp 98.3 °F (36.8 °C)   Resp 16   Ht 185.4 cm (72.99\")   Wt 103 kg (227 lb 9.6 oz)   SpO2 97%   BMI 30.03 kg/m²        Physical Exam  Physical Exam   Constitutional: He is oriented to person, place, and time. He appears well-developed and well-nourished. No distress.   HENT:   Head: Normocephalic and atraumatic.   Eyes: Conjunctivae are normal.   Cardiovascular: Normal rate, regular rhythm and normal heart sounds.   Pulmonary/Chest: Effort normal and breath sounds normal. No respiratory distress.   Musculoskeletal: He exhibits no edema.   Neurological: He is alert and oriented to person, place, and time.   Skin: Skin is warm and dry.   Psychiatric: His mood appears anxious.   Nursing note and vitals reviewed.      Results  Results for orders placed or performed in visit on 08/21/19   Comprehensive Metabolic Panel   Result Value Ref Range    Glucose 98 65 - 99 mg/dL    BUN 16 6 - 20 mg/dL    Creatinine 1.19 0.76 - 1.27 mg/dL    eGFR Non African Am 67 >60 mL/min/1.73    eGFR African Am 81 >60 mL/min/1.73    BUN/Creatinine Ratio 13.4 7.0 - 25.0    Sodium 143 136 - 145 mmol/L    Potassium 5.2 3.5 - 5.2 mmol/L    Chloride 102 98 - 107 mmol/L    Total CO2 28.8 22.0 - 29.0 mmol/L    Calcium 9.5 8.6 - 10.5 mg/dL    Total Protein 7.2 6.0 - 8.5 g/dL    Albumin 4.90 3.50 - 5.20 g/dL    Globulin 2.3 gm/dL    A/G Ratio 2.1 g/dL    Total Bilirubin 0.6 0.2 - 1.2 mg/dL    Alkaline Phosphatase 61 39 - 117 U/L    AST (SGOT) 19 1 - 40 U/L    ALT (SGPT) 25 1 - 41 U/L        Assessment and Plan  Taras was seen today for erectile dysfunction.    Diagnoses and all orders for this visit:    Essential hypertension  - BP above goal today due to not taking his medication for the last several months, he reports it was not called into " the pharmacy  - Lisinopril 10mg daily refilled today    Drug-induced erectile dysfunction  - secondary to chronic suboxone use  - Reports improvement in ED with cialis  - Will refill cialis    Low testosterone in male  - Reports longstanding low testosterone with initial level checked for fatigue, also has ED  - secondary to chronic suboxone use  - Denies common side effects including difficulty urinating, headache, dizziness, application site irritation  - Does have HTN that will need to be monitored  - Will check testosterone level today. Has had recent CBC and CMP.  - Androgel refilled. Controlled Substance Medication Agreement signed and on file.  Risks, benefits, and alternative treatments reviewed.  RICKEY report has been reviewed and scanned into the patient’s chart.     Health Maintenance  - Colonoscopy: At age 51.  - Immunizations: Flu given today. Discuss tdap at next visit.  - Depression screening: PHQ2 = 1 10/2019    Return in about 3 months (around 4/30/2020) for Follow up, Labs today.

## 2020-02-01 LAB — TESTOST SERPL-MCNC: 267 NG/DL (ref 264–916)

## 2020-04-30 ENCOUNTER — OFFICE VISIT (OUTPATIENT)
Dept: INTERNAL MEDICINE | Facility: CLINIC | Age: 45
End: 2020-04-30

## 2020-04-30 DIAGNOSIS — I10 ESSENTIAL HYPERTENSION: ICD-10-CM

## 2020-04-30 DIAGNOSIS — N52.2 DRUG-INDUCED ERECTILE DYSFUNCTION: ICD-10-CM

## 2020-04-30 DIAGNOSIS — R79.89 LOW TESTOSTERONE IN MALE: ICD-10-CM

## 2020-04-30 PROCEDURE — 99441 PR PHYS/QHP TELEPHONE EVALUATION 5-10 MIN: CPT | Performed by: INTERNAL MEDICINE

## 2020-04-30 RX ORDER — LISINOPRIL 10 MG/1
10 TABLET ORAL DAILY
Qty: 90 TABLET | Refills: 0 | Status: SHIPPED | OUTPATIENT
Start: 2020-04-30 | End: 2020-08-26 | Stop reason: SDUPTHER

## 2020-04-30 RX ORDER — TADALAFIL 10 MG/1
10 TABLET ORAL DAILY PRN
Qty: 30 TABLET | Refills: 1 | Status: SHIPPED | OUTPATIENT
Start: 2020-04-30 | End: 2020-08-26 | Stop reason: SDUPTHER

## 2020-04-30 RX ORDER — TESTOSTERONE 16.2 MG/G
4 GEL TRANSDERMAL DAILY
Qty: 450 G | Refills: 0 | Status: SHIPPED | OUTPATIENT
Start: 2020-04-30 | End: 2020-08-26 | Stop reason: SDUPTHER

## 2020-04-30 NOTE — PROGRESS NOTES
Internal Medicine Follow Up    Chief Complaint  Taras Phipps is a 44 y.o. male who presents today for follow up of chronic medical conditions outlined below.    Chief Complaint   Patient presents with   • Follow-up   • Hypertension   • Low Testosterone        HPI  Mr. Phipps was evaluated today by telephone for follow up. He denies any acute concerns. Doing well overall despite increased stress of pandemic and restrictions. No medication changes. He reports that he has been low on lisinopril due to lack of refills so has been taking it on and off so that it would last until this appointment. He reports checking his blood pressure and it has recently been well controlled. He is unable to check it today, however. He reports that cialis is working well for ED and testosterone is also effective. He denies fatigue, headache, difficulty urinating, chest pain, SOA, cough.       Review of Systems  Review of Systems   Constitutional: Negative for fatigue.   Respiratory: Negative for cough and shortness of breath.    Cardiovascular: Negative for chest pain.   Genitourinary: Negative for difficulty urinating.   Neurological: Negative for headache.   Psychiatric/Behavioral: Positive for stress. Negative for dysphoric mood and depressed mood.        Current Medications  Current Outpatient Medications on File Prior to Visit   Medication Sig Dispense Refill   • lisinopril (PRINIVIL,ZESTRIL) 10 MG tablet Take 1 tablet by mouth Daily. 30 tablet 2   • tadalafil (CIALIS) 10 MG tablet Take 1 tablet by mouth Daily As Needed for Erectile Dysfunction. Take 30 minutes before sexual activity. Do not use more than once daily. 30 tablet 1   • Testosterone 20.25 MG/ACT (1.62%) gel Apply 4 Pump topically to the appropriate area as directed Daily. 450 g 0   • ZUBSOLV 5.7-1.4 MG sublingual tablet        No current facility-administered medications on file prior to visit.        Allergies  No Known Allergies    Objective  There were no vitals taken  for this visit.     Physical Exam  Physical Exam   Constitutional: He is oriented to person, place, and time. No distress.   Pulmonary/Chest: Effort normal. No respiratory distress.   Speaks clearly in full sentences.   Neurological: He is alert and oriented to person, place, and time.   Psychiatric: He has a normal mood and affect. His behavior is normal. Judgment and thought content normal.       Results  Results for orders placed or performed in visit on 01/31/20   Testosterone   Result Value Ref Range    Testosterone, Total 267 264 - 916 ng/dL        Assessment and Plan  Taras was seen today for follow-up, hypertension and low testosterone.    Diagnoses and all orders for this visit:    Low testosterone in male  - Secondary to chronic suboxone use for OUD  - Benefiting from testosterone therapy and denies common side effects including difficulty urinating, headache, dizziness, application site irritation  - Testosterone level 1/2020 normal, plan to repeat before next visit. Patient notified to come to lab at 8am fasting.  - UDS at next appt  - Androgel refilled. Controlled Substance Medication Agreement signed and on file.  Risks, benefits, and alternative treatments reviewed.  RICKEY report has been reviewed and scanned into the patient’s chart.     Drug-induced erectile dysfunction  - secondary to chronic suboxone use  - Reports improvement in ED with cialis, refilled today.    Essential hypertension  - Reports good control at home on lisinopril 10mg daily, refilled today for 90 day supply to aid in compliance    Health Maintenance  - Colonoscopy: At age 51.  - HCV: negative  - Immunizations: Discuss tdap at next visit.  - Depression screening: PHQ2 = 1 10/2019       Return in about 3 months (around 7/30/2020) for Follow up, needs testosterone level at 8am fasting a few days before appt.     This visit has been rescheduled as a phone visit to comply with patient safety concerns in accordance with CDC  recommendations. Total time of discussion was 7 minutes.

## 2020-07-22 ENCOUNTER — TELEPHONE (OUTPATIENT)
Dept: INTERNAL MEDICINE | Facility: CLINIC | Age: 45
End: 2020-07-22

## 2020-08-20 DIAGNOSIS — R79.89 LOW TESTOSTERONE IN MALE: ICD-10-CM

## 2020-08-20 RX ORDER — TESTOSTERONE 16.2 MG/G
4 GEL TRANSDERMAL DAILY
Qty: 450 G | Refills: 0 | OUTPATIENT
Start: 2020-08-20

## 2020-08-20 NOTE — TELEPHONE ENCOUNTER
PT CALLED TO REQUEST REFILL FOR RX  Testosterone 20.25 MG/ACT (1.62%) gel.    PLEASE ADVISE.  CALL BACK:[phone]     [facility] [address] - [address] [place] [address]

## 2020-08-26 ENCOUNTER — LAB (OUTPATIENT)
Dept: LAB | Facility: HOSPITAL | Age: 45
End: 2020-08-26

## 2020-08-26 ENCOUNTER — OFFICE VISIT (OUTPATIENT)
Dept: INTERNAL MEDICINE | Facility: CLINIC | Age: 45
End: 2020-08-26

## 2020-08-26 VITALS
TEMPERATURE: 98.4 F | RESPIRATION RATE: 16 BRPM | DIASTOLIC BLOOD PRESSURE: 92 MMHG | OXYGEN SATURATION: 98 % | BODY MASS INDEX: 28.89 KG/M2 | HEART RATE: 79 BPM | HEIGHT: 73 IN | SYSTOLIC BLOOD PRESSURE: 148 MMHG | WEIGHT: 218 LBS

## 2020-08-26 DIAGNOSIS — R79.89 LOW TESTOSTERONE IN MALE: Primary | ICD-10-CM

## 2020-08-26 DIAGNOSIS — I10 ESSENTIAL HYPERTENSION: ICD-10-CM

## 2020-08-26 DIAGNOSIS — Z12.5 PROSTATE CANCER SCREENING: ICD-10-CM

## 2020-08-26 DIAGNOSIS — Z79.899 HIGH RISK MEDICATION USE: ICD-10-CM

## 2020-08-26 DIAGNOSIS — N52.2 DRUG-INDUCED ERECTILE DYSFUNCTION: ICD-10-CM

## 2020-08-26 LAB
ANION GAP SERPL CALCULATED.3IONS-SCNC: 8.1 MMOL/L (ref 5–15)
BUN SERPL-MCNC: 13 MG/DL (ref 6–20)
BUN/CREAT SERPL: 13.5 (ref 7–25)
CALCIUM SPEC-SCNC: 9.2 MG/DL (ref 8.6–10.5)
CHLORIDE SERPL-SCNC: 103 MMOL/L (ref 98–107)
CO2 SERPL-SCNC: 27.9 MMOL/L (ref 22–29)
CREAT SERPL-MCNC: 0.96 MG/DL (ref 0.76–1.27)
GFR SERPL CREATININE-BSD FRML MDRD: 85 ML/MIN/1.73
GLUCOSE SERPL-MCNC: 102 MG/DL (ref 65–99)
POTASSIUM SERPL-SCNC: 4.3 MMOL/L (ref 3.5–5.2)
PSA SERPL-MCNC: 0.19 NG/ML (ref 0–4)
SODIUM SERPL-SCNC: 139 MMOL/L (ref 136–145)

## 2020-08-26 PROCEDURE — 80048 BASIC METABOLIC PNL TOTAL CA: CPT | Performed by: INTERNAL MEDICINE

## 2020-08-26 PROCEDURE — G0103 PSA SCREENING: HCPCS | Performed by: INTERNAL MEDICINE

## 2020-08-26 PROCEDURE — 99214 OFFICE O/P EST MOD 30 MIN: CPT | Performed by: INTERNAL MEDICINE

## 2020-08-26 RX ORDER — TESTOSTERONE 16.2 MG/G
4 GEL TRANSDERMAL DAILY
Qty: 450 G | Refills: 0 | Status: SHIPPED | OUTPATIENT
Start: 2020-08-26 | End: 2020-11-24 | Stop reason: SDUPTHER

## 2020-08-26 RX ORDER — TADALAFIL 10 MG/1
10 TABLET ORAL DAILY PRN
Qty: 30 TABLET | Refills: 1 | Status: SHIPPED | OUTPATIENT
Start: 2020-08-26 | End: 2020-11-24 | Stop reason: SDUPTHER

## 2020-08-26 RX ORDER — LISINOPRIL 10 MG/1
10 TABLET ORAL DAILY
Qty: 90 TABLET | Refills: 1 | Status: SHIPPED | OUTPATIENT
Start: 2020-08-26 | End: 2020-11-24 | Stop reason: SDUPTHER

## 2020-08-26 NOTE — PROGRESS NOTES
"Internal Medicine Follow Up    Chief Complaint  Taras Phipps is a 44 y.o. male who presents today for follow up of chronic medical conditions outlined below.    Chief Complaint   Patient presents with   • Med Refill     Testosterone        HPI  Mr. Phipps is here today for medication refills. He missed recent appointment for the same. He continues to use testosterone. No issues noted. He denies headache, application site irritation, difficulty urinating. He is using cialis for ED. He continues to take lisinopril for HTN and reports normal BP readings at home. He needs refills of all of these medications. He additionally is on suboxone from his suboxone clinic.       Review of Systems  Review of Systems   Constitutional: Negative.    Respiratory: Negative.    Cardiovascular: Negative.    Genitourinary: Positive for erectile dysfunction. Negative for decreased urine volume and difficulty urinating.   Neurological: Negative for headache.        Current Medications  Current Outpatient Medications on File Prior to Visit   Medication Sig Dispense Refill   • ZUBSOLV 5.7-1.4 MG sublingual tablet      • [DISCONTINUED] lisinopril (PRINIVIL,ZESTRIL) 10 MG tablet Take 1 tablet by mouth Daily. 90 tablet 0   • [DISCONTINUED] tadalafil (CIALIS) 10 MG tablet Take 1 tablet by mouth Daily As Needed for Erectile Dysfunction. Take 30 minutes before sexual activity. Do not use more than once daily. 30 tablet 1   • [DISCONTINUED] Testosterone 20.25 MG/ACT (1.62%) gel Apply 4 Pump topically to the appropriate area as directed Daily. 450 g 0     No current facility-administered medications on file prior to visit.        Allergies  No Known Allergies    Objective  Visit Vitals  /92   Pulse 79   Temp 98.4 °F (36.9 °C)   Resp 16   Ht 185.4 cm (72.99\")   Wt 98.9 kg (218 lb)   SpO2 98%   BMI 28.77 kg/m²        Physical Exam  Physical Exam   Constitutional: He is oriented to person, place, and time. He appears well-developed and " well-nourished. No distress.   HENT:   Head: Normocephalic and atraumatic.   Eyes: Conjunctivae are normal.   Pulmonary/Chest: Effort normal. No respiratory distress.   Musculoskeletal: He exhibits no edema.   Neurological: He is alert and oriented to person, place, and time.   Skin: Skin is warm and dry.   Psychiatric: He has a normal mood and affect.   Nursing note and vitals reviewed.      Results  Results for orders placed or performed in visit on 01/31/20   Testosterone   Result Value Ref Range    Testosterone, Total 267 264 - 916 ng/dL        Assessment and Plan  Taras was seen today for med refill.    Diagnoses and all orders for this visit:    Low testosterone in male  - Secondary to chronic suboxone use for OUD  - Benefiting from testosterone therapy and denies common side effects including difficulty urinating, headache, dizziness, application site irritation  - PSA today  - Testosterone level 1/2020 normal, plan to repeat at next visit.  - UDS today  - Androgel refilled. Controlled Substance Medication Agreement signed and on file.  Risks, benefits, and alternative treatments reviewed.  RICKEY report has been reviewed and scanned into the patient’s chart.     Prostate cancer screening  - PSA ordered, on testosterone therapy    Essential hypertension  - Reports good control at home on lisinopril 10mg daily, refilled today   - BMP ordered    Drug-induced erectile dysfunction  - secondary to chronic suboxone use  - Reports improvement in ED with cialis, refilled today.    Health Maintenance  - Colonoscopy: At age 51.  - HCV: negative  - Immunizations: Discuss tdap at next visit.  - Depression screening: PHQ2 = 1 10/2019    Return in about 3 months (around 11/26/2020) for Annual, Labs today.

## 2020-09-02 DIAGNOSIS — Z79.899 HIGH RISK MEDICATION USE: ICD-10-CM

## 2020-11-20 ENCOUNTER — TELEPHONE (OUTPATIENT)
Dept: INTERNAL MEDICINE | Facility: CLINIC | Age: 45
End: 2020-11-20

## 2020-11-20 NOTE — TELEPHONE ENCOUNTER
PT DOES NOT HAVE HEALTH INSURANCE AT THIS TIME, HE CAN AFFORD TO PAY FOR AN OFFICE VISIT OR TELEHEALTH VISIT, BUT CANNOT AFFORD LAB WORK.  PT NEEDS HIS MEDICATIONS REFILLED, PT WAS ALSO POTENTIALLY EXPOSED TO COVID    PLEASE ADVISE

## 2020-11-24 ENCOUNTER — TELEMEDICINE (OUTPATIENT)
Dept: INTERNAL MEDICINE | Facility: CLINIC | Age: 45
End: 2020-11-24

## 2020-11-24 DIAGNOSIS — R79.89 LOW TESTOSTERONE IN MALE: ICD-10-CM

## 2020-11-24 DIAGNOSIS — I10 ESSENTIAL HYPERTENSION: ICD-10-CM

## 2020-11-24 DIAGNOSIS — N52.2 DRUG-INDUCED ERECTILE DYSFUNCTION: ICD-10-CM

## 2020-11-24 PROCEDURE — 99213 OFFICE O/P EST LOW 20 MIN: CPT | Performed by: INTERNAL MEDICINE

## 2020-11-24 RX ORDER — TADALAFIL 10 MG/1
10 TABLET ORAL DAILY PRN
Qty: 30 TABLET | Refills: 1 | Status: SHIPPED | OUTPATIENT
Start: 2020-11-24 | End: 2021-03-03 | Stop reason: SDUPTHER

## 2020-11-24 RX ORDER — TESTOSTERONE 16.2 MG/G
4 GEL TRANSDERMAL DAILY
Qty: 450 G | Refills: 0 | Status: SHIPPED | OUTPATIENT
Start: 2020-11-24 | End: 2021-03-22 | Stop reason: CLARIF

## 2020-11-24 RX ORDER — LISINOPRIL 10 MG/1
10 TABLET ORAL DAILY
Qty: 90 TABLET | Refills: 1 | Status: SHIPPED | OUTPATIENT
Start: 2020-11-24 | End: 2021-03-03 | Stop reason: SDUPTHER

## 2020-11-24 NOTE — PROGRESS NOTES
Internal Medicine Follow Up    Chief Complaint  Taras Phipps is a 45 y.o. male who presents today for follow up of chronic medical conditions outlined below.    Chief Complaint   Patient presents with   • Follow-up     low testosterone, HTN, ED        HPI  Mr. Phipps was evaluated by video visit today for follow up. He reports COVID exposure at work 7-8 days ago. He has been asymptomatic and in quarantine. He reports needing refills of all of his medications. He has lost insurance coverage, looking into getting insurance and will come for labs when able. He notes BP well controlled on lisinopril 10mg daily, no BP readings that he is able to recall. He remains on testosterone gel and is hoping cost will be around $50 with [employer] at [employer]. Denies difficulty urinating, chest pain. He also continues to use cialis PRN without syncope, dizziness.       Review of Systems  Review of Systems   Constitutional: Negative.    Respiratory: Negative.    Cardiovascular: Negative.    Gastrointestinal: Negative.    Genitourinary: Negative.         Current Medications  Current Outpatient Medications on File Prior to Visit   Medication Sig Dispense Refill   • lisinopril (PRINIVIL,ZESTRIL) 10 MG tablet Take 1 tablet by mouth Daily. 90 tablet 1   • tadalafil (CIALIS) 10 MG tablet Take 1 tablet by mouth Daily As Needed for Erectile Dysfunction. Take 30 minutes before sexual activity. Do not use more than once daily. 30 tablet 1   • Testosterone 20.25 MG/ACT (1.62%) gel Apply 4 Pump topically to the appropriate area as directed Daily. 450 g 0   • ZUBSOLV 5.7-1.4 MG sublingual tablet        No current facility-administered medications on file prior to visit.        Allergies  No Known Allergies    Objective  There were no vitals taken for this visit.     Physical Exam  Physical Exam  Constitutional:       Appearance: Normal appearance. He is not ill-appearing.   HENT:      Head: Normocephalic and atraumatic.      Right Ear: External ear  normal.      Left Ear: External ear normal.   Eyes:      Conjunctiva/sclera: Conjunctivae normal.   Pulmonary:      Effort: Pulmonary effort is normal. No respiratory distress.   Neurological:      General: No focal deficit present.      Mental Status: He is alert and oriented to person, place, and time.      Comments: Speech clear and fluent         Results  Results for orders placed or performed in visit on 08/26/20   PSA Screen    Specimen: Blood   Result Value Ref Range    PSA 0.190 0.000 - 4.000 ng/mL   Basic Metabolic Panel    Specimen: Blood   Result Value Ref Range    Glucose 102 (H) 65 - 99 mg/dL    BUN 13 6 - 20 mg/dL    Creatinine 0.96 0.76 - 1.27 mg/dL    Sodium 139 136 - 145 mmol/L    Potassium 4.3 3.5 - 5.2 mmol/L    Chloride 103 98 - 107 mmol/L    CO2 27.9 22.0 - 29.0 mmol/L    Calcium 9.2 8.6 - 10.5 mg/dL    eGFR Non African Amer 85 >60 mL/min/1.73    BUN/Creatinine Ratio 13.5 7.0 - 25.0    Anion Gap 8.1 5.0 - 15.0 mmol/L        Assessment and Plan  Diagnoses and all orders for this visit:    Low testosterone in male  - Secondary to chronic suboxone use for OUD  - Benefiting from testosterone therapy and denies common side effects including difficulty urinating, headache, dizziness, application site irritation  - PSA nl 8/2020  - Testosterone level 1/2020 normal, plan to repeat today however does not have insurance coverage. Will notify office once he has insurance and level will be ordered.  - UDS UTD 8/2020, Controlled substance agreement UTD 8/2020  - Androgel refilled. RICKEY report has been reviewed and scanned into the patient’s chart.     Drug-induced erectile dysfunction  - secondary to chronic suboxone use  - Reports improvement in ED with cialis and no side effects, refilled today.    Essential hypertension  - Reports good control at home on lisinopril 10mg daily, refilled today     Health Maintenance  - Colonoscopy: At age 46.  - HCV: negative  - Immunizations: Discuss tdap and flu at next  visit.  - Depression screening: PHQ2 = 1 10/2019     Return in about 3 months (around 2/24/2021) for Annual.     This patient has consented to a telehealth visit via video. The visit was scheduled to comply with patient safety concerns in accordance with CDC recommendations.  I spent 13 minutes in total including but not limited to the 13 minutes spent in direct conversation with this patient.

## 2021-01-28 ENCOUNTER — TELEPHONE (OUTPATIENT)
Dept: INTERNAL MEDICINE | Facility: CLINIC | Age: 46
End: 2021-01-28

## 2021-01-28 NOTE — TELEPHONE ENCOUNTER
----- Message from Judi Hernandez MD sent at 1/26/2021  2:19 PM EST -----  Regarding: Testosterone denial  Please call Mr. Phipps and notify him that his insurance has denied his testosterone gel. They will approve a testosterone patch. When he is in need of refill he can call the office and a patch will be sent in rather than the gel.

## 2021-03-03 ENCOUNTER — OFFICE VISIT (OUTPATIENT)
Dept: INTERNAL MEDICINE | Facility: CLINIC | Age: 46
End: 2021-03-03

## 2021-03-03 ENCOUNTER — TELEPHONE (OUTPATIENT)
Dept: INTERNAL MEDICINE | Facility: CLINIC | Age: 46
End: 2021-03-03

## 2021-03-03 VITALS
BODY MASS INDEX: 28.28 KG/M2 | OXYGEN SATURATION: 98 % | RESPIRATION RATE: 16 BRPM | SYSTOLIC BLOOD PRESSURE: 146 MMHG | TEMPERATURE: 98.4 F | DIASTOLIC BLOOD PRESSURE: 84 MMHG | WEIGHT: 213.4 LBS | HEART RATE: 106 BPM | HEIGHT: 73 IN

## 2021-03-03 DIAGNOSIS — Z13.220 SCREENING, LIPID: ICD-10-CM

## 2021-03-03 DIAGNOSIS — Z00.00 ANNUAL PHYSICAL EXAM: Primary | ICD-10-CM

## 2021-03-03 DIAGNOSIS — J30.2 SEASONAL ALLERGIC RHINITIS, UNSPECIFIED TRIGGER: ICD-10-CM

## 2021-03-03 DIAGNOSIS — N52.2 DRUG-INDUCED ERECTILE DYSFUNCTION: ICD-10-CM

## 2021-03-03 DIAGNOSIS — Z13.1 SCREENING FOR DIABETES MELLITUS: ICD-10-CM

## 2021-03-03 DIAGNOSIS — R79.89 LOW TESTOSTERONE IN MALE: ICD-10-CM

## 2021-03-03 DIAGNOSIS — I10 ESSENTIAL HYPERTENSION: ICD-10-CM

## 2021-03-03 DIAGNOSIS — F19.11 HISTORY OF SUBSTANCE ABUSE (HCC): ICD-10-CM

## 2021-03-03 PROCEDURE — 99396 PREV VISIT EST AGE 40-64: CPT | Performed by: INTERNAL MEDICINE

## 2021-03-03 RX ORDER — TADALAFIL 10 MG/1
10 TABLET ORAL DAILY PRN
Qty: 30 TABLET | Refills: 1 | Status: SHIPPED | OUTPATIENT
Start: 2021-03-03 | End: 2022-03-24 | Stop reason: SDUPTHER

## 2021-03-03 RX ORDER — LISINOPRIL 10 MG/1
10 TABLET ORAL DAILY
Qty: 90 TABLET | Refills: 3 | Status: SHIPPED | OUTPATIENT
Start: 2021-03-03 | End: 2022-03-24 | Stop reason: SDUPTHER

## 2021-03-03 RX ORDER — CETIRIZINE HYDROCHLORIDE 10 MG/1
10 TABLET ORAL DAILY
COMMUNITY

## 2021-03-03 NOTE — PROGRESS NOTES
Internal Medicine Annual Exam  Taras Phipps is a 45 y.o. male who presents today for an annual exam and with concerns as outlined below.    Chief Complaint  Chief Complaint   Patient presents with   • Annual Exam   • Wheezing        HPI  Mr. Phipps comes in today for annual physical. He notes no change to medications but does note recent issues with allergies and wheezing noted by his wife. No SOA or cough. Has been using zyrtec PRN. He is a nonsmoker. He otherwise remains on lisinopril for HTN and notes that he has not taken this yet today. He also takes cialis for ED and zubsolv for JAY. He is UTD with dental exam but needs vision exam. He denies tobacco, drug use, heavy alcohol use.       Review of Systems  Review of Systems   Constitutional: Negative.    Eyes: Negative.    Respiratory: Positive for wheezing. Negative for cough, chest tightness and shortness of breath.    Cardiovascular: Negative.    Gastrointestinal: Negative.    Genitourinary: Negative.    Musculoskeletal: Negative.    Skin: Negative.    Allergic/Immunologic: Positive for environmental allergies.   Neurological: Negative.    Psychiatric/Behavioral: Negative.         Past Medical History  Past Medical History:   Diagnosis Date   • Depression         Surgical History  Past Surgical History:   Procedure Laterality Date   • EYELID LACERATION REPAIR      skin cancer   • TONSILLECTOMY          Family History  Family History   Problem Relation Age of Onset   • Mental illness Mother    • Breast cancer Mother 40   • Alcohol abuse Mother         passed at 44   • Mental illness Father    • Alcohol abuse Father    • Liver disease Father    • Obesity Maternal Uncle    • Diabetes Maternal Uncle    • Arthritis Maternal Grandmother    • Hypertension Maternal Grandmother    • Heart attack Maternal Grandfather    • No Known Problems Sister    • Heart disease Paternal Grandmother         needed ppm in 70s   • Heart disease Paternal Grandfather         needed ppm  "in 70s   • Crohn's disease Maternal Aunt    • Throat cancer Paternal Aunt    • Thyroid disease Paternal Aunt    • Colon cancer Neg Hx         Social History  Social History     Socioeconomic History   • Marital status:      Spouse name: Not on file   • Number of children: Not on file   • Years of education: Not on file   • Highest education level: Not on file   Tobacco Use   • Smoking status: Never Smoker   • Smokeless tobacco: Never Used   Substance and Sexual Activity   • Alcohol use: Yes     Frequency: 2-3 times a week     Comment: socially, typically on weekends. Described as excessive on occasion but unable to quantify.   • Drug use: No     Comment: past addiction to pain pills, quit [phone]   • Sexual activity: Defer        Current Medications  Current Outpatient Medications on File Prior to Visit   Medication Sig Dispense Refill   • lisinopril (PRINIVIL,ZESTRIL) 10 MG tablet Take 1 tablet by mouth Daily. 90 tablet 1   • tadalafil (CIALIS) 10 MG tablet Take 1 tablet by mouth Daily As Needed for Erectile Dysfunction. Take 30 minutes before sexual activity. Do not use more than once daily. 30 tablet 1   • Testosterone 20.25 MG/ACT (1.62%) gel Apply 4 Pump topically to the appropriate area as directed Daily. 450 g 0   • ZUBSOLV 5.7-1.4 MG sublingual tablet        No current facility-administered medications on file prior to visit.        Allergies  No Known Allergies     Objective  Visit Vitals  /84   Pulse 106   Temp 98.4 °F (36.9 °C)   Resp 16   Ht 185.4 cm (72.99\")   Wt 96.8 kg (213 lb 6.4 oz)   SpO2 98%   BMI 28.16 kg/m²        Physical Exam  Physical Exam  Vitals signs and nursing note reviewed.   Constitutional:       General: He is not in acute distress.     Appearance: He is well-developed. He is not diaphoretic.   HENT:      Head: Normocephalic and atraumatic.      Right Ear: Tympanic membrane, ear canal and external ear normal.      Left Ear: Tympanic membrane, ear canal and external ear " normal.      Nose: Nose normal.   Eyes:      General: No scleral icterus.     Extraocular Movements: Extraocular movements intact.      Conjunctiva/sclera: Conjunctivae normal.      Pupils: Pupils are equal, round, and reactive to light.   Neck:      Musculoskeletal: Neck supple.   Cardiovascular:      Rate and Rhythm: Normal rate and regular rhythm.      Heart sounds: Normal heart sounds. No murmur.   Pulmonary:      Effort: Pulmonary effort is normal. No respiratory distress.      Breath sounds: Normal breath sounds. No wheezing.   Abdominal:      General: Bowel sounds are normal. There is no distension.      Palpations: Abdomen is soft.      Tenderness: There is no abdominal tenderness.   Musculoskeletal:         General: No deformity.      Right lower leg: No edema.      Left lower leg: No edema.   Lymphadenopathy:      Cervical: No cervical adenopathy.   Skin:     General: Skin is warm and dry.      Findings: No rash.   Neurological:      General: No focal deficit present.      Mental Status: He is alert and oriented to person, place, and time. Mental status is at baseline.      Gait: Gait normal.   Psychiatric:         Mood and Affect: Mood normal.         Behavior: Behavior normal.      Comments: Fidgety, hyperactive          Results  Results for orders placed or performed in visit on 08/26/20   PSA Screen    Specimen: Blood   Result Value Ref Range    PSA 0.190 0.000 - 4.000 ng/mL   Basic Metabolic Panel    Specimen: Blood   Result Value Ref Range    Glucose 102 (H) 65 - 99 mg/dL    BUN 13 6 - 20 mg/dL    Creatinine 0.96 0.76 - 1.27 mg/dL    Sodium 139 136 - 145 mmol/L    Potassium 4.3 3.5 - 5.2 mmol/L    Chloride 103 98 - 107 mmol/L    CO2 27.9 22.0 - 29.0 mmol/L    Calcium 9.2 8.6 - 10.5 mg/dL    eGFR Non African Amer 85 >60 mL/min/1.73    BUN/Creatinine Ratio 13.5 7.0 - 25.0    Anion Gap 8.1 5.0 - 15.0 mmol/L        Assessment and Plan  Diagnoses and all orders for this visit:    Annual physical exam  -  Counseling was given to patient for the following topics:  appropriate exercise, healthy eating habits, disease prevention and importance of immunizations, including risks and benefits. Also discussed the importance of regular dental and vision care, as well recommendation for a yearly screening skin exam after age 40.  Written information provided to patient on these topics and other health maintenance issues.  - Discussed flu vaccine, unavailable in office today  - Discussed colon cancer screening at age 46    Drug-induced erectile dysfunction  - secondary to chronic suboxone use  - Reports improvement in ED with cialis, refilled today.    Low testosterone in male  - Secondary to chronic suboxone use for OUD  - Benefiting from testosterone therapy and denies common side effects including difficulty urinating, headache, dizziness, application site irritation  - PSA nl 8/2020  - Testosterone level 1/2020 normal, plan to have him return to repeat 8am fasting testosterone level  - UDS 8/2020 appropriate. CSA 8/2020.  - Androgel no longer covered by insurance. Will check with pharmacy regarding Vogelxo pump.    History of substance abuse (CMS/[facility])  - Stable on zubsolv, follows with Dr. Escalona  - Also attends monthly meetings    Essential hypertension  - Reports good control at home on lisinopril 10mg daily but has not yet taken his medication today. Refilled lisinopril today.   - BMP ordered    Screening, lipid  - Lipid panel ordered    Screening for diabetes mellitus  - A1c ordered    Seasonal allergic rhinitis, unspecified trigger  - No wheezing on lung exam, suspect audible wheezing reported by his wife is upper airway sound from postnasal drainage secondary to allergies.  - Continue [facility]    Health Maintenance   Topic Date Due   • TDAP/TD VACCINES (1 - Tdap) 09/29/1995   • INFLUENZA VACCINE  08/01/2020   • ANNUAL PHYSICAL  08/22/2020   • HEPATITIS C SCREENING  Completed   • Pneumococcal Vaccine 0-64  Aged Out   •  MENINGOCOCCAL VACCINE  Aged Out     Health Maintenance  - Colonoscopy: At age 46.  - HCV: negative  - Immunizations: flu unavailable.  - Depression screening: negative 3/2021    Return in about 3 months (around 6/3/2021) for Follow up testosterone refill, 1 year for annual, Labs today.

## 2021-03-22 ENCOUNTER — TELEPHONE (OUTPATIENT)
Dept: INTERNAL MEDICINE | Facility: CLINIC | Age: 46
End: 2021-03-22

## 2021-03-22 ENCOUNTER — PRIOR AUTHORIZATION (OUTPATIENT)
Dept: INTERNAL MEDICINE | Facility: CLINIC | Age: 46
End: 2021-03-22

## 2021-03-22 DIAGNOSIS — R79.89 LOW TESTOSTERONE IN MALE: Primary | ICD-10-CM

## 2021-03-22 RX ORDER — TESTOSTERONE 12.5 MG/1.25G
4 GEL TOPICAL DAILY
Qty: 75 G | Refills: 2 | Status: SHIPPED | OUTPATIENT
Start: 2021-03-22 | End: 2022-03-24

## 2021-03-22 NOTE — TELEPHONE ENCOUNTER
The medication is called Vogelxo pump and it was not sent in because it would require a PA. He stated he did not want it sent if PA would be required due to wait time. I will send it in today. Please inform patient it has been sent in but PA will need to be completed.

## 2021-03-22 NOTE — TELEPHONE ENCOUNTER
PATIENT STATED THAT HE SEEN DR COTE ON 3-3-21 AND WAS TO BE PRESCRIBED MEDICATION(PATIENT NOT SURE OF NAME) BUT NOTHING HAS BEEN CALLED INTO PHARMACY.    CALL BACK:[phone]      PHARMACY:[facility] [address] [place] [address] [phone] [facility] [phone]   [phone]

## 2021-03-23 ENCOUNTER — TELEPHONE (OUTPATIENT)
Dept: INTERNAL MEDICINE | Facility: CLINIC | Age: 46
End: 2021-03-23

## 2021-03-23 NOTE — TELEPHONE ENCOUNTER
PT IS NOT WILLING TO WAIT FOR A PRIOR AUTH FOR THE TESTOSTERONE    THE ONLY OTHER WAY TO HAVE THIS FILLED IS FOR DR COTE TO CONTACT [facility] AND HAVE A PEER TO PEER CONSULT  [phone]      PT WANTS HIS PRESCRIPTION FILLED TODAY

## 2021-03-24 NOTE — TELEPHONE ENCOUNTER
"Prescription will not be able to be filled today. PA has been submitted per chart 3/22/2021. Will await response which I expect in the next 24-48 hours. I understand he wants his medication but this does not fall under the category of \"urgent request.\"  "

## 2022-03-24 ENCOUNTER — OFFICE VISIT (OUTPATIENT)
Dept: FAMILY MEDICINE CLINIC | Facility: CLINIC | Age: 47
End: 2022-03-24

## 2022-03-24 ENCOUNTER — LAB (OUTPATIENT)
Dept: LAB | Facility: HOSPITAL | Age: 47
End: 2022-03-24

## 2022-03-24 VITALS
SYSTOLIC BLOOD PRESSURE: 142 MMHG | DIASTOLIC BLOOD PRESSURE: 98 MMHG | RESPIRATION RATE: 16 BRPM | BODY MASS INDEX: 30.22 KG/M2 | HEART RATE: 89 BPM | TEMPERATURE: 98.4 F | OXYGEN SATURATION: 99 % | HEIGHT: 73 IN | WEIGHT: 228 LBS

## 2022-03-24 DIAGNOSIS — L98.9 SKIN LESION: ICD-10-CM

## 2022-03-24 DIAGNOSIS — E29.1 HYPOGONADISM IN MALE: Primary | ICD-10-CM

## 2022-03-24 DIAGNOSIS — I10 ESSENTIAL HYPERTENSION: ICD-10-CM

## 2022-03-24 DIAGNOSIS — N52.2 DRUG-INDUCED ERECTILE DYSFUNCTION: ICD-10-CM

## 2022-03-24 LAB
ALBUMIN SERPL-MCNC: 5.2 G/DL (ref 3.5–5.2)
ALBUMIN/GLOB SERPL: 1.9 G/DL
ALP SERPL-CCNC: 70 U/L (ref 39–117)
ALT SERPL W P-5'-P-CCNC: 38 U/L (ref 1–41)
ANION GAP SERPL CALCULATED.3IONS-SCNC: 8.8 MMOL/L (ref 5–15)
AST SERPL-CCNC: 28 U/L (ref 1–40)
BILIRUB SERPL-MCNC: 0.4 MG/DL (ref 0–1.2)
BUN SERPL-MCNC: 21 MG/DL (ref 6–20)
BUN/CREAT SERPL: 21.2 (ref 7–25)
CALCIUM SPEC-SCNC: 10 MG/DL (ref 8.6–10.5)
CHLORIDE SERPL-SCNC: 103 MMOL/L (ref 98–107)
CHOLEST SERPL-MCNC: 203 MG/DL (ref 0–200)
CO2 SERPL-SCNC: 28.2 MMOL/L (ref 22–29)
CREAT SERPL-MCNC: 0.99 MG/DL (ref 0.76–1.27)
DEPRECATED RDW RBC AUTO: 38.7 FL (ref 37–54)
EGFRCR SERPLBLD CKD-EPI 2021: 95.7 ML/MIN/1.73
ERYTHROCYTE [DISTWIDTH] IN BLOOD BY AUTOMATED COUNT: 12.4 % (ref 12.3–15.4)
GLOBULIN UR ELPH-MCNC: 2.8 GM/DL
GLUCOSE SERPL-MCNC: 103 MG/DL (ref 65–99)
HCT VFR BLD AUTO: 44.5 % (ref 37.5–51)
HDLC SERPL-MCNC: 75 MG/DL (ref 40–60)
HGB BLD-MCNC: 15.4 G/DL (ref 13–17.7)
LDLC SERPL CALC-MCNC: 100 MG/DL (ref 0–100)
LDLC/HDLC SERPL: 1.26 {RATIO}
MCH RBC QN AUTO: 30.3 PG (ref 26.6–33)
MCHC RBC AUTO-ENTMCNC: 34.6 G/DL (ref 31.5–35.7)
MCV RBC AUTO: 87.4 FL (ref 79–97)
PLATELET # BLD AUTO: 288 10*3/MM3 (ref 140–450)
PMV BLD AUTO: 11.4 FL (ref 6–12)
POTASSIUM SERPL-SCNC: 5.1 MMOL/L (ref 3.5–5.2)
PROT SERPL-MCNC: 8 G/DL (ref 6–8.5)
RBC # BLD AUTO: 5.09 10*6/MM3 (ref 4.14–5.8)
SODIUM SERPL-SCNC: 140 MMOL/L (ref 136–145)
TRIGL SERPL-MCNC: 167 MG/DL (ref 0–150)
TSH SERPL DL<=0.05 MIU/L-ACNC: 2.36 UIU/ML (ref 0.27–4.2)
VLDLC SERPL-MCNC: 28 MG/DL (ref 5–40)
WBC NRBC COR # BLD: 9.85 10*3/MM3 (ref 3.4–10.8)

## 2022-03-24 PROCEDURE — 83036 HEMOGLOBIN GLYCOSYLATED A1C: CPT

## 2022-03-24 PROCEDURE — 99214 OFFICE O/P EST MOD 30 MIN: CPT | Performed by: STUDENT IN AN ORGANIZED HEALTH CARE EDUCATION/TRAINING PROGRAM

## 2022-03-24 PROCEDURE — 80061 LIPID PANEL: CPT

## 2022-03-24 PROCEDURE — 80053 COMPREHEN METABOLIC PANEL: CPT

## 2022-03-24 PROCEDURE — 85027 COMPLETE CBC AUTOMATED: CPT

## 2022-03-24 PROCEDURE — 84443 ASSAY THYROID STIM HORMONE: CPT

## 2022-03-24 RX ORDER — LISINOPRIL 10 MG/1
10 TABLET ORAL DAILY
Qty: 90 TABLET | Refills: 0 | Status: SHIPPED | OUTPATIENT
Start: 2022-03-24

## 2022-03-24 RX ORDER — TADALAFIL 10 MG/1
10 TABLET ORAL DAILY PRN
Qty: 30 TABLET | Refills: 1 | Status: SHIPPED | OUTPATIENT
Start: 2022-03-24

## 2022-03-24 NOTE — PROGRESS NOTES
New Patient Office Visit      Patient Name: Taras Phipps  : 1975   MRN: [id number]     Chief Complaint:  Establish Care (Est care, spot on face-wants this removed asap-has had basal cell cancer on eyelid before, used to see [facility] for testosterone-quit going because couldn't get med covered under insurance)     History of Present Illness:     Taking lisinopril for htn. He is currently out of lisinopril. Has tolerated it well in the past.    Taking cialis for ed. He does not have any chest pain. No history of heart problems that he knows of. Needs refill of this.     On zubsolv for substance use disorder.He sees [facility].     Has a history of low testosterone and used to see [facility] for this but medication was expensive could not get it covered under insurance so he stopped taking it.     He says that he has a spot on the right side of his face that is chronic in nature. He has been seen for this by dermatology in the past and was told it was nothing to worry about and had it frozen off but says it grew back larger.      Subjective        Past Medical History:   Diagnosis Date   • Depression        Past Surgical History:   Procedure Laterality Date   • EYELID LACERATION REPAIR      skin cancer   • TONSILLECTOMY         Family History   Problem Relation Age of Onset   • Mental illness Mother    • Breast cancer Mother 40   • Alcohol abuse Mother         passed at 44   • Mental illness Father    • Alcohol abuse Father    • Liver disease Father    • No Known Problems Sister    • Crohn's disease Maternal Aunt    • Obesity Maternal Uncle    • Diabetes Maternal Uncle    • Throat cancer Paternal Aunt    • Thyroid disease Paternal Aunt    • Arthritis Maternal Grandmother    • Hypertension Maternal Grandmother    • Heart attack Maternal Grandfather    • Stroke Paternal Grandmother    • Hypertension Paternal Grandmother    • Heart disease Paternal Grandmother         needed  "ppm in 70s   • Dementia Paternal Grandmother    • Heart disease Paternal Grandfather         needed ppm in 70s   • Heart attack Paternal Grandfather    • Colon cancer Neg Hx        Social History     Socioeconomic History   • Marital status:    Tobacco Use   • Smoking status: Never Smoker   • Smokeless tobacco: Never Used   Vaping Use   • Vaping Use: Never used   Substance and Sexual Activity   • Alcohol use: Yes     Comment: socially, typically on weekends. Described as excessive on occasion but unable to quantify.   • Drug use: No     Comment: past addiction to pain pills, quit [id number]   • Sexual activity: Yes          Current Outpatient Medications:   •  cetirizine (zyrTEC) 10 MG tablet, Take 10 mg by mouth Daily., Disp: , Rfl:   •  lisinopril (PRINIVIL,ZESTRIL) 10 MG tablet, Take 1 tablet by mouth Daily., Disp: 90 tablet, Rfl: 0  •  tadalafil (CIALIS) 10 MG tablet, Take 1 tablet by mouth Daily As Needed for Erectile Dysfunction. Take 30 minutes before sexual activity. Do not use more than once daily., Disp: 30 tablet, Rfl: 1  •  ZUBSOLV 5.7-1.4 MG sublingual tablet, , Disp: , Rfl:     No Known Allergies    Objective     Physical Exam:  Vitals:    03/24/22 1431   BP: 142/98   BP Location: Left arm   Patient Position: Sitting   Cuff Size: Adult   Pulse: 89   Resp: 16   Temp: 98.4 °F (36.9 °C)   TempSrc: Temporal   SpO2: 99%   Weight: 103 kg (228 lb)   Height: 185.4 cm (73\")      Body mass index is 30.08 kg/m².     Physical Exam  Constitutional:       General: He is not in acute distress.     Appearance: Normal appearance.   HENT:      Head: Normocephalic and atraumatic.   Eyes:      Extraocular Movements: Extraocular movements intact.   Cardiovascular:      Rate and Rhythm: Normal rate and regular rhythm.      Heart sounds: No murmur heard.  Pulmonary:      Effort: Pulmonary effort is normal. No respiratory distress.      Breath sounds: Normal breath sounds.   Abdominal:      General: Abdomen is flat. "   Musculoskeletal:         General: No swelling.      Cervical back: Normal range of motion.   Skin:     Comments: Has 2 by 1.5 cm raised lesion on his right cheek. Appears to be seborrheic.    Neurological:      General: No focal deficit present.      Mental Status: He is alert. Mental status is at baseline.   Psychiatric:         Mood and Affect: Mood normal.              Assessment / Plan      Assessment/Plan:   Diagnoses and all orders for this visit:    1. Hypogonadism in male (Primary)  -     Ambulatory Referral to Urology    2. Essential hypertension  -     lisinopril (PRINIVIL,ZESTRIL) 10 MG tablet; Take 1 tablet by mouth Daily.  Dispense: 90 tablet; Refill: 0  -     CBC (No Diff); Future  -     Comprehensive Metabolic Panel; Future  -     Hemoglobin A1c; Future  -     TSH Rfx On Abnormal To Free T4; Future  -     Lipid Panel; Future    3. Drug-induced erectile dysfunction  -     tadalafil (CIALIS) 10 MG tablet; Take 1 tablet by mouth Daily As Needed for Erectile Dysfunction. Take 30 minutes before sexual activity. Do not use more than once daily.  Dispense: 30 tablet; Refill: 1    4. Skin lesion  -     Ambulatory Referral to Dermatology       He is concerned about getting in with dermatology soon to have this lesion on his face removed, so referral is placed urgently. I spoke with referral coordinator about getting him in as soon as possible. Discussed with him I feel given it is on his face just below his eye he should have it removed by dermatology to avoid any scarring if possible.     Return in about 3 months (around 6/24/2022).       Antoinette Paul D.O.  [facility]

## 2022-03-25 LAB — HBA1C MFR BLD: 5.5 % (ref 4.8–5.6)

## 2022-03-29 ENCOUNTER — TELEPHONE (OUTPATIENT)
Dept: FAMILY MEDICINE CLINIC | Facility: CLINIC | Age: 47
End: 2022-03-29

## 2022-03-29 NOTE — TELEPHONE ENCOUNTER
Caller: Juan Ramon Phipps    Relationship: Self    Best call back number: [phone]    What is the best time to reach you: ANYTIME    Who are you requesting to speak with (clinical staff, provider,  specific staff member): CHERELLE THURSTON OR CLINICAL STAFF    Do you know the name of the person who called: JUAN RAMON     What was the call regarding: PATIENT WOULD LIKE TO SWITCH PROVIDERS TO CHERELLE THURSTON; HE WOULD LIKE TO ALSO DISCUSES SKIN ISSUES    Do you require a callback: PLEASE CALL BACK

## 2022-04-25 DIAGNOSIS — R79.89 LOW TESTOSTERONE IN MALE: Primary | ICD-10-CM

## 2022-05-02 ENCOUNTER — TELEPHONE (OUTPATIENT)
Dept: FAMILY MEDICINE CLINIC | Facility: CLINIC | Age: 47
End: 2022-05-02

## 2022-07-28 NOTE — TELEPHONE ENCOUNTER
Called in androgel to [facility]. 150 g no rf. PA approval is for 30 day supply at a time.  
not applicable (Male)

## 2023-09-18 ENCOUNTER — OFFICE VISIT (OUTPATIENT)
Dept: FAMILY MEDICINE CLINIC | Facility: CLINIC | Age: 48
End: 2023-09-18
Payer: COMMERCIAL

## 2023-09-18 VITALS
SYSTOLIC BLOOD PRESSURE: 130 MMHG | BODY MASS INDEX: 31.91 KG/M2 | HEIGHT: 73 IN | DIASTOLIC BLOOD PRESSURE: 96 MMHG | OXYGEN SATURATION: 98 % | TEMPERATURE: 98.9 F | WEIGHT: 240.8 LBS | RESPIRATION RATE: 22 BRPM | HEART RATE: 88 BPM

## 2023-09-18 DIAGNOSIS — E29.1 HYPOGONADISM IN MALE: Primary | ICD-10-CM

## 2023-09-18 DIAGNOSIS — Z12.5 ENCOUNTER FOR SPECIAL SCREENING EXAMINATION FOR NEOPLASM OF PROSTATE: ICD-10-CM

## 2023-09-18 DIAGNOSIS — I10 ESSENTIAL HYPERTENSION: ICD-10-CM

## 2023-09-18 DIAGNOSIS — N52.2 DRUG-INDUCED ERECTILE DYSFUNCTION: ICD-10-CM

## 2023-09-18 PROCEDURE — 99214 OFFICE O/P EST MOD 30 MIN: CPT | Performed by: STUDENT IN AN ORGANIZED HEALTH CARE EDUCATION/TRAINING PROGRAM

## 2023-09-18 RX ORDER — TADALAFIL 10 MG/1
10 TABLET ORAL DAILY PRN
Qty: 30 TABLET | Refills: 0 | Status: SHIPPED | OUTPATIENT
Start: 2023-09-18

## 2023-09-18 RX ORDER — BUPRENORPHINE AND NALOXONE 8; 2 MG/1; MG/1
1 FILM, SOLUBLE BUCCAL; SUBLINGUAL DAILY
COMMUNITY
Start: 2023-08-29

## 2023-09-18 RX ORDER — LISINOPRIL 10 MG/1
10 TABLET ORAL DAILY
Qty: 90 TABLET | Refills: 0 | Status: SHIPPED | OUTPATIENT
Start: 2023-09-18

## 2023-09-18 NOTE — PROGRESS NOTES
"Chief Complaint  Hypogonadism (Would like to discuss testosterone medication. )    History of Present Illness    Hypogonadism  He was on this in the past and felt this medication helped with his symptoms but he was unable to be stable on this given coverage issues with insurance. His symptom he says is low energy and ED. He denies any symptoms of bph.       Htn  Tolerated lisinopril in the past and would like to be back on this .       The following portions of the patient's history were reviewed and updated as appropriate: allergies, current medications, past family history, past medical history, past social history, past surgical history, and problem list.    OBJECTIVE:  /96   Pulse 88   Temp 98.9 °F (37.2 °C) (Temporal)   Resp 22   Ht 185.4 cm (72.99\")   Wt 109 kg (240 lb 12.8 oz)   SpO2 98%   BMI 31.78 kg/m²       Physical Exam  Constitutional:       General: He is not in acute distress.     Appearance: Normal appearance.   HENT:      Head: Normocephalic and atraumatic.   Eyes:      Extraocular Movements: Extraocular movements intact.   Cardiovascular:      Rate and Rhythm: Normal rate and regular rhythm.      Heart sounds: No murmur heard.  Pulmonary:      Effort: Pulmonary effort is normal. No respiratory distress.      Breath sounds: Normal breath sounds. No stridor. No wheezing, rhonchi or rales.   Skin:     Findings: No rash.   Neurological:      General: No focal deficit present.      Mental Status: He is alert.   Psychiatric:         Mood and Affect: Mood normal.                  Assessment and Plan   Diagnoses and all orders for this visit:    1. Hypogonadism in male (Primary)  -     Testosterone; Future  -     FSH & LH; Future  -     PSA Screen; Future    2. Essential hypertension  -     lisinopril (PRINIVIL,ZESTRIL) 10 MG tablet; Take 1 tablet by mouth Daily.  Dispense: 90 tablet; Refill: 0    3. Drug-induced erectile dysfunction  -     tadalafil (CIALIS) 10 MG tablet; Take 1 tablet by " mouth Daily As Needed for Erectile Dysfunction. Take 30 minutes before sexual activity. Do not use more than once daily.  Dispense: 30 tablet; Refill: 0    4. Encounter for special screening examination for neoplasm of prostate  -     PSA Screen; Future        This patient does not have:   history of BPH, renal impairment, liver impairment, heart problems, blood clots, uncontrolled hypertension, prostate cancer, breast cancer.  I counseled him that It may cause an increase in psa, blood pressure. It can cause bph or even prostate cancer, increase in rbcs. It can cause emotional lability, mood swings, anger, sadness,  a problem with cholesterol or liver function, testicular disease, priapism.      Htn  Start back on lisinopril, counseled on side effects such as kidney or electrolyte issues, lip swelling possible.     ED  Start back on cialis prn. Counseled on priapism, go to er if this happens. Counseled on risk of hypotension/dizziness or vision abnormalities on this.       Return in about 6 weeks (around 10/30/2023).       Antoinette Paul D.O.  [facility]

## 2023-09-19 ENCOUNTER — LAB (OUTPATIENT)
Dept: LAB | Facility: HOSPITAL | Age: 48
End: 2023-09-19
Payer: COMMERCIAL

## 2023-09-19 DIAGNOSIS — Z12.5 ENCOUNTER FOR SPECIAL SCREENING EXAMINATION FOR NEOPLASM OF PROSTATE: ICD-10-CM

## 2023-09-19 DIAGNOSIS — E29.1 HYPOGONADISM IN MALE: ICD-10-CM

## 2023-09-19 LAB
FSH SERPL-ACNC: 2.86 MIU/ML
LH SERPL-ACNC: 2.29 MIU/ML
PSA SERPL-MCNC: 0.28 NG/ML (ref 0–4)
TESTOST SERPL-MCNC: 103 NG/DL (ref 249–836)

## 2023-09-19 PROCEDURE — 84403 ASSAY OF TOTAL TESTOSTERONE: CPT

## 2023-09-19 PROCEDURE — 83540 ASSAY OF IRON: CPT

## 2023-09-19 PROCEDURE — 36415 COLL VENOUS BLD VENIPUNCTURE: CPT

## 2023-09-19 PROCEDURE — 83002 ASSAY OF GONADOTROPIN (LH): CPT

## 2023-09-19 PROCEDURE — 84466 ASSAY OF TRANSFERRIN: CPT

## 2023-09-19 PROCEDURE — 83001 ASSAY OF GONADOTROPIN (FSH): CPT

## 2023-09-19 PROCEDURE — 84146 ASSAY OF PROLACTIN: CPT

## 2023-09-19 PROCEDURE — 84443 ASSAY THYROID STIM HORMONE: CPT

## 2023-09-19 PROCEDURE — G0103 PSA SCREENING: HCPCS

## 2023-09-20 DIAGNOSIS — R79.89 ELEVATED PROLACTIN LEVEL: Primary | ICD-10-CM

## 2023-09-20 DIAGNOSIS — E29.1 HYPOGONADISM IN MALE: Primary | ICD-10-CM

## 2023-09-20 LAB
IRON 24H UR-MRATE: 144 MCG/DL (ref 59–158)
IRON SATN MFR SERPL: 40 % (ref 20–50)
PROLACTIN SERPL-MCNC: 15.9 NG/ML (ref 4.04–15.2)
TIBC SERPL-MCNC: 364 MCG/DL (ref 298–536)
TRANSFERRIN SERPL-MCNC: 244 MG/DL (ref 200–360)
TSH SERPL DL<=0.05 MIU/L-ACNC: 2.3 UIU/ML (ref 0.27–4.2)

## 2023-09-20 NOTE — PROGRESS NOTES
Please call the patient to let him know that since we talked he had a prolactin level come back mildly high. This is another hormone that comes from the brain so I do recommend to repeat the mri brain due to this to make sure no tumors are causing this to be high and test to be low. He will be called to schedule this.

## 2023-10-02 ENCOUNTER — LAB (OUTPATIENT)
Dept: LAB | Facility: HOSPITAL | Age: 48
End: 2023-10-02
Payer: COMMERCIAL

## 2023-10-02 DIAGNOSIS — E29.1 HYPOGONADISM IN MALE: ICD-10-CM

## 2023-10-02 LAB
CORTIS AM PEAK SERPL-MCNC: 16.91 MCG/DL
TESTOST SERPL-MCNC: 185 NG/DL (ref 249–836)

## 2023-10-02 PROCEDURE — 84403 ASSAY OF TOTAL TESTOSTERONE: CPT

## 2023-10-02 PROCEDURE — 36415 COLL VENOUS BLD VENIPUNCTURE: CPT

## 2023-10-02 PROCEDURE — 82533 TOTAL CORTISOL: CPT

## 2023-10-03 DIAGNOSIS — R79.89 LOW TESTOSTERONE IN MALE: Primary | ICD-10-CM

## 2023-10-03 RX ORDER — TESTOSTERONE CYPIONATE 100 MG/ML
100 INJECTION, SOLUTION INTRAMUSCULAR
Qty: 4 ML | Refills: 0 | Status: SHIPPED | OUTPATIENT
Start: 2023-10-03 | End: 2023-10-04

## 2023-10-03 NOTE — PROGRESS NOTES
Please let him know I have called in the testosterone medication. He should follow up with me in 4 weeks to see how he is doing or sooner if needed.

## 2023-10-04 ENCOUNTER — TELEPHONE (OUTPATIENT)
Dept: FAMILY MEDICINE CLINIC | Facility: CLINIC | Age: 48
End: 2023-10-04

## 2023-10-04 DIAGNOSIS — R79.89 LOW TESTOSTERONE IN MALE: Primary | ICD-10-CM

## 2023-10-04 RX ORDER — TESTOSTERONE CYPIONATE 200 MG/ML
100 VIAL (ML) INTRAMUSCULAR
Qty: 2 ML | Refills: 0 | Status: SHIPPED | OUTPATIENT
Start: 2023-10-04

## 2023-10-04 NOTE — TELEPHONE ENCOUNTER
"  [phone] [place] [address] [address] [facility] [address]  [address] [facility] [address] - [phone]  - [phone] FX    Relationship: Pharmacy    Best call back number: [phone]     THE TESTOSTERONE 100MG/ML IS NOT AVAILABLE. DO YOU WANT TO CHANGE IT TO THE 200MG/ML AND CHANGE THE DIRECTIONS?    What medications are you currently taking:   Current Outpatient Medications on File Prior to Visit   Medication Sig Dispense Refill    buprenorphine-naloxone (SUBOXONE) 8-2 MG film film Place 1 film under the tongue Daily.      lisinopril (PRINIVIL,ZESTRIL) 10 MG tablet Take 1 tablet by mouth Daily. 90 tablet 0    Needle, Disp, 23G X 1-1/2\" misc Use 1 each 1 (One) Time Per Week. 8 each 0    tadalafil (CIALIS) 10 MG tablet Take 1 tablet by mouth Daily As Needed for Erectile Dysfunction. Take 30 minutes before sexual activity. Do not use more than once daily. 30 tablet 0    testosterone cypionate (Depo-Testosterone) 100 MG/ML solution injection Inject 1 mL into the appropriate muscle as directed by prescriber Every 7 (Seven) Days. 4 mL 0     No current facility-administered medications on file prior to visit.      "

## 2023-10-04 NOTE — TELEPHONE ENCOUNTER
Caller: Taras Phipps III    Relationship: Self    Best call back number: [phone]     What is the best time to reach you: ANY    Who are you requesting to speak with (clinical staff, provider,  specific staff member): CLINICAL STAFF    What was the call regarding: PATIENT WANTED TO CHECK ON THE STATUS OF MEDICATION CLARIFICATION FOR HIS  TESTOSTERONE MEDICATION      Is it okay if the provider responds through MyChart: YES OR CALL

## 2023-10-25 DIAGNOSIS — R79.89 LOW TESTOSTERONE IN MALE: Primary | ICD-10-CM

## 2023-10-30 ENCOUNTER — HOSPITAL ENCOUNTER (OUTPATIENT)
Dept: MRI IMAGING | Facility: HOSPITAL | Age: 48
Discharge: HOME | End: 2023-10-30
Payer: COMMERCIAL

## 2023-10-30 ENCOUNTER — LAB (OUTPATIENT)
Dept: LAB | Facility: HOSPITAL | Age: 48
End: 2023-10-30
Payer: COMMERCIAL

## 2023-10-30 DIAGNOSIS — R79.89 ELEVATED PROLACTIN LEVEL: ICD-10-CM

## 2023-10-30 DIAGNOSIS — R79.89 LOW TESTOSTERONE IN MALE: ICD-10-CM

## 2023-10-30 LAB — TESTOST SERPL-MCNC: 532 NG/DL (ref 249–836)

## 2023-10-30 PROCEDURE — A9577 INJ MULTIHANCE: HCPCS | Performed by: STUDENT IN AN ORGANIZED HEALTH CARE EDUCATION/TRAINING PROGRAM

## 2023-10-30 PROCEDURE — 0 GADOBENATE DIMEGLUMINE 529 MG/ML SOLUTION: Performed by: STUDENT IN AN ORGANIZED HEALTH CARE EDUCATION/TRAINING PROGRAM

## 2023-10-30 PROCEDURE — 36415 COLL VENOUS BLD VENIPUNCTURE: CPT

## 2023-10-30 PROCEDURE — 84403 ASSAY OF TOTAL TESTOSTERONE: CPT

## 2023-10-30 PROCEDURE — 70553 MRI BRAIN STEM W/O & W/DYE: CPT

## 2023-10-30 RX ADMIN — GADOBENATE DIMEGLUMINE 20 ML: 529 INJECTION, SOLUTION INTRAVENOUS at 19:05

## 2023-11-01 DIAGNOSIS — R79.89 LOW TESTOSTERONE IN MALE: ICD-10-CM

## 2023-11-01 RX ORDER — TESTOSTERONE CYPIONATE 200 MG/ML
100 VIAL (ML) INTRAMUSCULAR
Qty: 2 ML | Refills: 1 | Status: SHIPPED | OUTPATIENT
Start: 2023-11-01 | End: 2023-11-02 | Stop reason: SDUPTHER

## 2023-11-02 DIAGNOSIS — R79.89 LOW TESTOSTERONE IN MALE: ICD-10-CM

## 2023-11-02 RX ORDER — TESTOSTERONE CYPIONATE 200 MG/ML
100 VIAL (ML) INTRAMUSCULAR
Qty: 2 ML | Refills: 1 | Status: SHIPPED | OUTPATIENT
Start: 2023-11-02

## 2023-11-03 ENCOUNTER — OFFICE VISIT (OUTPATIENT)
Dept: FAMILY MEDICINE CLINIC | Facility: CLINIC | Age: 48
End: 2023-11-03
Payer: COMMERCIAL

## 2023-11-03 ENCOUNTER — LAB (OUTPATIENT)
Dept: LAB | Facility: HOSPITAL | Age: 48
End: 2023-11-03
Payer: COMMERCIAL

## 2023-11-03 VITALS
BODY MASS INDEX: 32.87 KG/M2 | TEMPERATURE: 98.4 F | SYSTOLIC BLOOD PRESSURE: 150 MMHG | RESPIRATION RATE: 21 BRPM | HEIGHT: 73 IN | HEART RATE: 75 BPM | DIASTOLIC BLOOD PRESSURE: 98 MMHG | OXYGEN SATURATION: 98 % | WEIGHT: 248 LBS

## 2023-11-03 DIAGNOSIS — N52.2 DRUG-INDUCED ERECTILE DYSFUNCTION: ICD-10-CM

## 2023-11-03 DIAGNOSIS — I10 ESSENTIAL HYPERTENSION: ICD-10-CM

## 2023-11-03 DIAGNOSIS — R79.89 LOW TESTOSTERONE IN MALE: Primary | ICD-10-CM

## 2023-11-03 PROCEDURE — 85027 COMPLETE CBC AUTOMATED: CPT | Performed by: STUDENT IN AN ORGANIZED HEALTH CARE EDUCATION/TRAINING PROGRAM

## 2023-11-03 PROCEDURE — 80061 LIPID PANEL: CPT | Performed by: STUDENT IN AN ORGANIZED HEALTH CARE EDUCATION/TRAINING PROGRAM

## 2023-11-03 PROCEDURE — 80053 COMPREHEN METABOLIC PANEL: CPT | Performed by: STUDENT IN AN ORGANIZED HEALTH CARE EDUCATION/TRAINING PROGRAM

## 2023-11-03 PROCEDURE — 83036 HEMOGLOBIN GLYCOSYLATED A1C: CPT | Performed by: STUDENT IN AN ORGANIZED HEALTH CARE EDUCATION/TRAINING PROGRAM

## 2023-11-03 PROCEDURE — 82306 VITAMIN D 25 HYDROXY: CPT | Performed by: STUDENT IN AN ORGANIZED HEALTH CARE EDUCATION/TRAINING PROGRAM

## 2023-11-03 PROCEDURE — 82607 VITAMIN B-12: CPT | Performed by: STUDENT IN AN ORGANIZED HEALTH CARE EDUCATION/TRAINING PROGRAM

## 2023-11-03 RX ORDER — LISINOPRIL 10 MG/1
10 TABLET ORAL DAILY
Qty: 90 TABLET | Refills: 0 | Status: SHIPPED | OUTPATIENT
Start: 2023-11-03

## 2023-11-03 RX ORDER — SYRINGE W-NEEDLE,DISPOSAB,3 ML 25GX5/8"
1 SYRINGE, EMPTY DISPOSABLE MISCELLANEOUS WEEKLY
Qty: 30 EACH | Refills: 1 | Status: SHIPPED | OUTPATIENT
Start: 2023-11-03

## 2023-11-03 RX ORDER — TADALAFIL 10 MG/1
10 TABLET ORAL DAILY PRN
Qty: 30 TABLET | Refills: 5 | Status: SHIPPED | OUTPATIENT
Start: 2023-11-03

## 2023-11-03 NOTE — PROGRESS NOTES
"Chief Complaint  Hypogonadism (6 wk fu) and Hypertension (6 wk fu)    History of Present Illness      Htn  Tolerating lisinopril. He did not take it today so bp is high.       Hypogonadism  He feels good on the testosterone. Later in the week he will feel a bit tired from it possibly wearing off.       The following portions of the patient's history were reviewed and updated as appropriate: allergies, current medications, past family history, past medical history, past social history, past surgical history, and problem list.    OBJECTIVE:  /98   Pulse 75   Temp 98.4 °F (36.9 °C) (Temporal)   Resp 21   Ht 185.4 cm (72.99\")   Wt 112 kg (248 lb)   SpO2 98%   BMI 32.73 kg/m²       Physical Exam  Constitutional:       General: He is not in acute distress.     Appearance: Normal appearance.   HENT:      Head: Normocephalic and atraumatic.   Eyes:      Extraocular Movements: Extraocular movements intact.   Cardiovascular:      Rate and Rhythm: Normal rate and regular rhythm.      Heart sounds: No murmur heard.  Pulmonary:      Effort: Pulmonary effort is normal. No respiratory distress.      Breath sounds: Normal breath sounds. No stridor. No wheezing, rhonchi or rales.   Skin:     Findings: No rash.   Neurological:      General: No focal deficit present.      Mental Status: He is alert.   Psychiatric:         Mood and Affect: Mood normal.                    Assessment and Plan   Diagnoses and all orders for this visit:    1. Low testosterone in male (Primary)  -     CBC (No Diff); Future  -     Comprehensive Metabolic Panel; Future  -     Hemoglobin A1c; Future  -     Lipid Panel; Future  -     Vitamin B12; Future  -     Vitamin D,25-Hydroxy; Future    Other orders  -     Fluzone (or Fluarix & Flulaval for VFC) >6mos  -     Needle, Disp, 23G X 1-1/2\" misc; Use 1 each 1 (One) Time Per Week.  Dispense: 20 each; Refill: 1      Htn  He did not take his bp med today, will resume  Check labs per " above      Hypogonadism  He reports no unwanted s/e from the testosterone. Continue.     Return in about 3 months (around 2/3/2024).       Antoinette Paul D.O.  [facility]

## 2023-11-04 LAB
25(OH)D3 SERPL-MCNC: 39 NG/ML (ref 30–100)
ALBUMIN SERPL-MCNC: 4.6 G/DL (ref 3.5–5.2)
ALBUMIN/GLOB SERPL: 1.7 G/DL
ALP SERPL-CCNC: 64 U/L (ref 39–117)
ALT SERPL W P-5'-P-CCNC: 62 U/L (ref 1–41)
ANION GAP SERPL CALCULATED.3IONS-SCNC: 8 MMOL/L (ref 5–15)
AST SERPL-CCNC: 46 U/L (ref 1–40)
BILIRUB SERPL-MCNC: 0.5 MG/DL (ref 0–1.2)
BUN SERPL-MCNC: 12 MG/DL (ref 6–20)
BUN/CREAT SERPL: 11.9 (ref 7–25)
CALCIUM SPEC-SCNC: 9.4 MG/DL (ref 8.6–10.5)
CHLORIDE SERPL-SCNC: 102 MMOL/L (ref 98–107)
CHOLEST SERPL-MCNC: 159 MG/DL (ref 0–200)
CO2 SERPL-SCNC: 29 MMOL/L (ref 22–29)
CREAT SERPL-MCNC: 1.01 MG/DL (ref 0.76–1.27)
DEPRECATED RDW RBC AUTO: 43.4 FL (ref 37–54)
EGFRCR SERPLBLD CKD-EPI 2021: 92.3 ML/MIN/1.73
ERYTHROCYTE [DISTWIDTH] IN BLOOD BY AUTOMATED COUNT: 13.2 % (ref 12.3–15.4)
GLOBULIN UR ELPH-MCNC: 2.7 GM/DL
GLUCOSE SERPL-MCNC: 111 MG/DL (ref 65–99)
HBA1C MFR BLD: 5.3 % (ref 4.8–5.6)
HCT VFR BLD AUTO: 43.7 % (ref 37.5–51)
HDLC SERPL-MCNC: 68 MG/DL (ref 40–60)
HGB BLD-MCNC: 15 G/DL (ref 13–17.7)
LDLC SERPL CALC-MCNC: 54 MG/DL (ref 0–100)
LDLC/HDLC SERPL: 0.65 {RATIO}
MCH RBC QN AUTO: 30.9 PG (ref 26.6–33)
MCHC RBC AUTO-ENTMCNC: 34.3 G/DL (ref 31.5–35.7)
MCV RBC AUTO: 89.9 FL (ref 79–97)
PLATELET # BLD AUTO: 254 10*3/MM3 (ref 140–450)
PMV BLD AUTO: 11.8 FL (ref 6–12)
POTASSIUM SERPL-SCNC: 4 MMOL/L (ref 3.5–5.2)
PROT SERPL-MCNC: 7.3 G/DL (ref 6–8.5)
RBC # BLD AUTO: 4.86 10*6/MM3 (ref 4.14–5.8)
SODIUM SERPL-SCNC: 139 MMOL/L (ref 136–145)
TRIGL SERPL-MCNC: 233 MG/DL (ref 0–150)
VIT B12 BLD-MCNC: 1309 PG/ML (ref 211–946)
VLDLC SERPL-MCNC: 37 MG/DL (ref 5–40)
WBC NRBC COR # BLD: 7.94 10*3/MM3 (ref 3.4–10.8)

## 2023-12-05 DIAGNOSIS — R79.89 LOW TESTOSTERONE IN MALE: ICD-10-CM

## 2023-12-05 RX ORDER — TESTOSTERONE CYPIONATE 200 MG/ML
100 VIAL (ML) INTRAMUSCULAR
Qty: 4 ML | Refills: 1 | Status: SHIPPED | OUTPATIENT
Start: 2023-12-05

## 2024-01-16 DIAGNOSIS — R79.89 LOW TESTOSTERONE IN MALE: ICD-10-CM

## 2024-01-26 DIAGNOSIS — R79.89 LOW TESTOSTERONE IN MALE: ICD-10-CM

## 2024-01-26 RX ORDER — TESTOSTERONE CYPIONATE 200 MG/ML
100 VIAL (ML) INTRAMUSCULAR
Qty: 4 ML | Refills: 1 | Status: SHIPPED | OUTPATIENT
Start: 2024-01-26

## 2024-02-15 ENCOUNTER — OFFICE VISIT (OUTPATIENT)
Dept: FAMILY MEDICINE CLINIC | Facility: CLINIC | Age: 49
End: 2024-02-15
Payer: COMMERCIAL

## 2024-02-15 VITALS
OXYGEN SATURATION: 95 % | BODY MASS INDEX: 32.05 KG/M2 | HEART RATE: 113 BPM | DIASTOLIC BLOOD PRESSURE: 86 MMHG | WEIGHT: 241.8 LBS | RESPIRATION RATE: 21 BRPM | TEMPERATURE: 98.4 F | HEIGHT: 73 IN | SYSTOLIC BLOOD PRESSURE: 138 MMHG

## 2024-02-15 DIAGNOSIS — I10 ESSENTIAL HYPERTENSION: ICD-10-CM

## 2024-02-15 DIAGNOSIS — R79.89 LOW TESTOSTERONE IN MALE: ICD-10-CM

## 2024-02-15 DIAGNOSIS — R79.89 ELEVATED LFTS: Primary | ICD-10-CM

## 2024-02-15 RX ORDER — LISINOPRIL 10 MG/1
10 TABLET ORAL DAILY
Qty: 90 TABLET | Refills: 3 | Status: SHIPPED | OUTPATIENT
Start: 2024-02-15

## 2024-02-21 ENCOUNTER — LAB (OUTPATIENT)
Dept: LAB | Facility: HOSPITAL | Age: 49
End: 2024-02-21
Payer: COMMERCIAL

## 2024-02-21 DIAGNOSIS — R79.89 ELEVATED LFTS: ICD-10-CM

## 2024-02-21 DIAGNOSIS — R79.89 LOW TESTOSTERONE IN MALE: ICD-10-CM

## 2024-02-21 LAB
ALBUMIN SERPL-MCNC: 4.4 G/DL (ref 3.5–5.2)
ALBUMIN/GLOB SERPL: 1.5 G/DL
ALP SERPL-CCNC: 67 U/L (ref 39–117)
ALT SERPL W P-5'-P-CCNC: 24 U/L (ref 1–41)
ANION GAP SERPL CALCULATED.3IONS-SCNC: 12.5 MMOL/L (ref 5–15)
AST SERPL-CCNC: 34 U/L (ref 1–40)
BILIRUB SERPL-MCNC: 0.4 MG/DL (ref 0–1.2)
BUN SERPL-MCNC: 11 MG/DL (ref 6–20)
BUN/CREAT SERPL: 9.7 (ref 7–25)
CALCIUM SPEC-SCNC: 9 MG/DL (ref 8.6–10.5)
CHLORIDE SERPL-SCNC: 102 MMOL/L (ref 98–107)
CHOLEST SERPL-MCNC: 164 MG/DL (ref 0–200)
CO2 SERPL-SCNC: 25.5 MMOL/L (ref 22–29)
CREAT SERPL-MCNC: 1.13 MG/DL (ref 0.76–1.27)
DEPRECATED RDW RBC AUTO: 38.4 FL (ref 37–54)
EGFRCR SERPLBLD CKD-EPI 2021: 80.7 ML/MIN/1.73
ERYTHROCYTE [DISTWIDTH] IN BLOOD BY AUTOMATED COUNT: 12.2 % (ref 12.3–15.4)
GLOBULIN UR ELPH-MCNC: 2.9 GM/DL
GLUCOSE SERPL-MCNC: 96 MG/DL (ref 65–99)
HCT VFR BLD AUTO: 53.2 % (ref 37.5–51)
HDLC SERPL-MCNC: 67 MG/DL (ref 40–60)
HGB BLD-MCNC: 18.7 G/DL (ref 13–17.7)
LDLC SERPL CALC-MCNC: 73 MG/DL (ref 0–100)
LDLC/HDLC SERPL: 1.03 {RATIO}
MCH RBC QN AUTO: 30.7 PG (ref 26.6–33)
MCHC RBC AUTO-ENTMCNC: 35.2 G/DL (ref 31.5–35.7)
MCV RBC AUTO: 87.4 FL (ref 79–97)
PLATELET # BLD AUTO: 232 10*3/MM3 (ref 140–450)
PMV BLD AUTO: 10.7 FL (ref 6–12)
POTASSIUM SERPL-SCNC: 4.8 MMOL/L (ref 3.5–5.2)
PROT SERPL-MCNC: 7.3 G/DL (ref 6–8.5)
RBC # BLD AUTO: 6.09 10*6/MM3 (ref 4.14–5.8)
SODIUM SERPL-SCNC: 140 MMOL/L (ref 136–145)
TESTOST SERPL-MCNC: 829 NG/DL (ref 249–836)
TRIGL SERPL-MCNC: 141 MG/DL (ref 0–150)
VLDLC SERPL-MCNC: 24 MG/DL (ref 5–40)
WBC NRBC COR # BLD AUTO: 7.44 10*3/MM3 (ref 3.4–10.8)

## 2024-02-21 PROCEDURE — 80053 COMPREHEN METABOLIC PANEL: CPT

## 2024-02-21 PROCEDURE — 84403 ASSAY OF TOTAL TESTOSTERONE: CPT

## 2024-02-21 PROCEDURE — 36415 COLL VENOUS BLD VENIPUNCTURE: CPT

## 2024-02-21 PROCEDURE — 85027 COMPLETE CBC AUTOMATED: CPT

## 2024-02-21 PROCEDURE — 80061 LIPID PANEL: CPT

## 2024-02-22 NOTE — PROGRESS NOTES
Please call the patient to let the patient know that his testosterone level is too high and it has caused his red blood cells to increase which puts him at risk for blood clots and cardiovascular disease.   Can we confirm with him that he is taking 100 mg every 7 days and no more than that? If so we will need to decrease his dose as this can be dangerous.

## 2024-04-02 DIAGNOSIS — R79.89 LOW TESTOSTERONE IN MALE: Primary | ICD-10-CM

## 2024-04-03 DIAGNOSIS — R79.89 LOW TESTOSTERONE IN MALE: ICD-10-CM

## 2024-04-04 DIAGNOSIS — R79.89 LOW TESTOSTERONE IN MALE: Primary | ICD-10-CM

## 2024-04-10 ENCOUNTER — CLINICAL SUPPORT (OUTPATIENT)
Dept: FAMILY MEDICINE CLINIC | Facility: CLINIC | Age: 49
End: 2024-04-10
Payer: COMMERCIAL

## 2024-04-10 ENCOUNTER — LAB (OUTPATIENT)
Dept: LAB | Facility: HOSPITAL | Age: 49
End: 2024-04-10
Payer: COMMERCIAL

## 2024-04-10 DIAGNOSIS — R79.89 LOW TESTOSTERONE IN MALE: ICD-10-CM

## 2024-04-10 LAB
DEPRECATED RDW RBC AUTO: 42.7 FL (ref 37–54)
ERYTHROCYTE [DISTWIDTH] IN BLOOD BY AUTOMATED COUNT: 13.6 % (ref 12.3–15.4)
HCT VFR BLD AUTO: 55.3 % (ref 37.5–51)
HGB BLD-MCNC: 19 G/DL (ref 13–17.7)
MCH RBC QN AUTO: 30.6 PG (ref 26.6–33)
MCHC RBC AUTO-ENTMCNC: 34.4 G/DL (ref 31.5–35.7)
MCV RBC AUTO: 89 FL (ref 79–97)
PLATELET # BLD AUTO: 216 10*3/MM3 (ref 140–450)
PMV BLD AUTO: 10.8 FL (ref 6–12)
RBC # BLD AUTO: 6.21 10*6/MM3 (ref 4.14–5.8)
TESTOST SERPL-MCNC: 524 NG/DL (ref 249–836)
WBC NRBC COR # BLD AUTO: 6.71 10*3/MM3 (ref 3.4–10.8)

## 2024-04-10 PROCEDURE — 36415 COLL VENOUS BLD VENIPUNCTURE: CPT

## 2024-04-10 PROCEDURE — 85027 COMPLETE CBC AUTOMATED: CPT

## 2024-04-10 PROCEDURE — 84403 ASSAY OF TOTAL TESTOSTERONE: CPT

## 2024-04-11 ENCOUNTER — TELEPHONE (OUTPATIENT)
Dept: FAMILY MEDICINE CLINIC | Facility: CLINIC | Age: 49
End: 2024-04-11
Payer: COMMERCIAL

## 2024-04-11 NOTE — TELEPHONE ENCOUNTER
HUB TO RELAY:    ----- Message from Antoinette Paul DO sent at 4/11/2024  1:45 PM EDT -----  Please call the patient to let him know that unfortunately the testosterone has caused his blood cells to elevate. This places him at risk for blood clotting.   We will have to decrease the testosterone dosing. For now we can decrease to 100 mg every 10 days rather than every seven. Have him return after one month of doing this to recheck.

## 2024-04-11 NOTE — PROGRESS NOTES
Please call the patient to let him know that unfortunately the testosterone has caused his blood cells to elevate. This places him at risk for blood clotting.   We will have to decrease the testosterone dosing. For now we can decrease to 100 mg every 10 days rather than every seven. Have him return after one month of doing this to recheck.

## 2024-04-11 NOTE — TELEPHONE ENCOUNTER
Name: Moise Taras MARINELLI      Relationship: Self      Best Callback Number: [phone]      HUB PROVIDED THE RELAY MESSAGE FROM THE OFFICE      PATIENT: VOICED UNDERSTANDING AND HAS NO FURTHER QUESTIONS AT THIS TIME    ADDITIONAL INFORMATION:  PATIENT NEEDS YOU TO CALL IN TESTOSTERONE FOR HIM SO THAT HE CAN DECREASE THE TESTOSTERONE

## 2024-04-11 NOTE — TELEPHONE ENCOUNTER
PATIENT ALSO WANTED YOU TO KNOW THAT HE HAS BEEN DRINKING MORE THAN HE SHOULD. ON A WEEKLY BASIS HE HAS MORE THAN 10 A WEEK. USUALLY ABOUT 2-4 EVERY DAY DUE TO SOME STRESS

## 2024-04-12 DIAGNOSIS — Z78.9 ALCOHOL USE: Primary | ICD-10-CM

## 2024-04-12 DIAGNOSIS — R79.89 LOW TESTOSTERONE IN MALE: ICD-10-CM

## 2024-04-12 RX ORDER — TESTOSTERONE CYPIONATE 200 MG/ML
100 VIAL (ML) INTRAMUSCULAR
Qty: 2 ML | Refills: 0 | Status: SHIPPED | OUTPATIENT
Start: 2024-04-12

## 2024-04-12 RX ORDER — TESTOSTERONE CYPIONATE 200 MG/ML
INJECTION, SOLUTION INTRAMUSCULAR
Qty: 4 ML | OUTPATIENT
Start: 2024-04-12

## 2024-04-12 NOTE — TELEPHONE ENCOUNTER
Patient is needing the testosterone dosage changed and sent in with the new dosage he can only use 1/2 the bottle and has to discard because the testosterone does not have a preservative in it can someone call him to correct this.

## 2024-04-17 LAB — DRUGS UR: NORMAL

## 2024-08-09 DIAGNOSIS — N52.2 DRUG-INDUCED ERECTILE DYSFUNCTION: ICD-10-CM

## 2024-08-09 RX ORDER — TADALAFIL 10 MG/1
10 TABLET ORAL DAILY PRN
Qty: 30 TABLET | Refills: 5 | Status: SHIPPED | OUTPATIENT
Start: 2024-08-09

## 2024-08-26 ENCOUNTER — OFFICE VISIT (OUTPATIENT)
Dept: FAMILY MEDICINE CLINIC | Facility: CLINIC | Age: 49
End: 2024-08-26
Payer: COMMERCIAL

## 2024-08-26 VITALS
OXYGEN SATURATION: 98 % | HEART RATE: 83 BPM | DIASTOLIC BLOOD PRESSURE: 88 MMHG | RESPIRATION RATE: 20 BRPM | SYSTOLIC BLOOD PRESSURE: 150 MMHG

## 2024-08-26 DIAGNOSIS — G89.29 CHRONIC LEFT SHOULDER PAIN: ICD-10-CM

## 2024-08-26 DIAGNOSIS — M25.512 CHRONIC LEFT SHOULDER PAIN: ICD-10-CM

## 2024-08-26 DIAGNOSIS — I10 ESSENTIAL HYPERTENSION: ICD-10-CM

## 2024-08-26 DIAGNOSIS — E29.1 HYPOGONADISM IN MALE: Primary | ICD-10-CM

## 2024-08-26 PROCEDURE — 99214 OFFICE O/P EST MOD 30 MIN: CPT | Performed by: STUDENT IN AN ORGANIZED HEALTH CARE EDUCATION/TRAINING PROGRAM

## 2024-08-26 RX ORDER — LISINOPRIL 20 MG/1
20 TABLET ORAL DAILY
Qty: 90 TABLET | Refills: 0 | Status: SHIPPED | OUTPATIENT
Start: 2024-08-26

## 2024-08-26 RX ORDER — MELOXICAM 7.5 MG/1
7.5 TABLET ORAL DAILY PRN
Qty: 30 TABLET | Refills: 0 | Status: SHIPPED | OUTPATIENT
Start: 2024-08-26

## 2024-08-26 NOTE — PROGRESS NOTES
Chief Complaint  low testosterone in male (Pt needs refill) and Med Refill    History of Present Illness          Htn  No cough no dizziness.    He doesn't typically check outside of here.       Hypogonadism  Complicated by elevated rbcs. He has been off testosterone for about 3 months.       He was pulling some wire with his left shoulder about a couple months ago. He has normal rom. No sharp or significant pain but he says it is a constant nag. The pain is a/c joint. The pain doesn't radiate. He has not taken anything for it.         The following portions of the patient's history were reviewed and updated as appropriate: allergies, current medications, past family history, past medical history, past social history, past surgical history, and problem list.    OBJECTIVE:  /88   Pulse 83   Resp 20   SpO2 98%       Physical Exam  Constitutional:       General: He is not in acute distress.     Appearance: Normal appearance.   HENT:      Head: Normocephalic and atraumatic.   Eyes:      Extraocular Movements: Extraocular movements intact.   Cardiovascular:      Rate and Rhythm: Normal rate and regular rhythm.      Heart sounds: No murmur heard.  Pulmonary:      Effort: Pulmonary effort is normal. No respiratory distress.      Breath sounds: Normal breath sounds. No stridor. No wheezing, rhonchi or rales.   Musculoskeletal:      Comments: Left shoulder has pain at a c joint with positive jobs and lift off testing and negative scarf testing  Left arm normal rom   Skin:     Findings: No rash.   Neurological:      General: No focal deficit present.      Mental Status: He is alert.   Psychiatric:         Mood and Affect: Mood normal.                  Assessment and Plan   Diagnoses and all orders for this visit:    1. Hypogonadism in male (Primary)  -     Testosterone; Future  -     CBC (No Diff); Future  -     Lipid Panel; Future  -     Cancel: Testosterone  -     Cancel: CBC (No Diff)  -     Cancel: Lipid Panel  -  "    Lipid Panel; Future  -     Testosterone; Future  -     CBC (No Diff); Future  -     Compliance Drug Analysis, Ur - Urine, Clean Catch; Future    2. Chronic left shoulder pain  -     XR Shoulder 2+ View Left (In Office)    3. Essential hypertension  -     lisinopril (PRINIVIL,ZESTRIL) 20 MG tablet; Take 1 tablet by mouth Daily.  Dispense: 90 tablet; Refill: 0    Other orders  -     meloxicam (Mobic) 7.5 MG tablet; Take 1 tablet by mouth Daily As Needed for Moderate Pain.  Dispense: 30 tablet; Refill: 0        A/c joint pain  We discussed xray physical therapy mri or steroid injection given pain with jobs testing and lift off testing, but this is mild and he has pain actually low level at all times so hard to tell with testing.   He likely has a/c joint tendonitis vs low grade tear. He would like to try meloxicam first . Counseled on risk of high bp kidney issues stomach upset or bleeding on nsaid long term, he hopes to take short term.         Increase lisinopril      Start back on testosterone if rbcs back to baseline. This medication, testosterone, can cause heart problems, blood clots, uncontrolled hypertension, prostate cancer. It may cause an increase in psa level or inability to urinate properly, blood pressure elevation. It can also cause \"thickening \" of the blood which can cause stroke/heart attack/blood clot. It can cause emotional lability, mood swings, anger, sadness. He has not had this problem. He feels libido and mood are improved with treatment.     Return in about 1 month (around 9/26/2024).       Antoinette Paul D.O.  [facility]   "

## 2024-09-02 LAB — DRUGS UR: NORMAL

## 2024-09-04 ENCOUNTER — LAB (OUTPATIENT)
Dept: LAB | Facility: HOSPITAL | Age: 49
End: 2024-09-04
Payer: COMMERCIAL

## 2024-09-04 DIAGNOSIS — E29.1 HYPOGONADISM IN MALE: ICD-10-CM

## 2024-09-04 LAB
CHOLEST SERPL-MCNC: 202 MG/DL (ref 0–200)
DEPRECATED RDW RBC AUTO: 38.3 FL (ref 37–54)
ERYTHROCYTE [DISTWIDTH] IN BLOOD BY AUTOMATED COUNT: 11.9 % (ref 12.3–15.4)
HCT VFR BLD AUTO: 43.9 % (ref 37.5–51)
HDLC SERPL-MCNC: 88 MG/DL (ref 40–60)
HGB BLD-MCNC: 15.4 G/DL (ref 13–17.7)
LDLC SERPL CALC-MCNC: 84 MG/DL (ref 0–100)
LDLC/HDLC SERPL: 0.89 {RATIO}
MCH RBC QN AUTO: 31.2 PG (ref 26.6–33)
MCHC RBC AUTO-ENTMCNC: 35.1 G/DL (ref 31.5–35.7)
MCV RBC AUTO: 89 FL (ref 79–97)
PLATELET # BLD AUTO: 221 10*3/MM3 (ref 140–450)
PMV BLD AUTO: 10.9 FL (ref 6–12)
RBC # BLD AUTO: 4.93 10*6/MM3 (ref 4.14–5.8)
TESTOST SERPL-MCNC: 174 NG/DL (ref 249–836)
TRIGL SERPL-MCNC: 180 MG/DL (ref 0–150)
VLDLC SERPL-MCNC: 30 MG/DL (ref 5–40)
WBC NRBC COR # BLD AUTO: 7.84 10*3/MM3 (ref 3.4–10.8)

## 2024-09-04 PROCEDURE — 36415 COLL VENOUS BLD VENIPUNCTURE: CPT

## 2024-09-04 PROCEDURE — 80061 LIPID PANEL: CPT

## 2024-09-04 PROCEDURE — 84403 ASSAY OF TOTAL TESTOSTERONE: CPT

## 2024-09-04 PROCEDURE — 85027 COMPLETE CBC AUTOMATED: CPT

## 2024-09-05 DIAGNOSIS — R79.89 LOW TESTOSTERONE IN MALE: ICD-10-CM

## 2024-09-05 RX ORDER — TESTOSTERONE CYPIONATE 200 MG/ML
100 VIAL (ML) INTRAMUSCULAR
Qty: 2 ML | Refills: 0 | Status: SHIPPED | OUTPATIENT
Start: 2024-09-05

## 2024-09-05 NOTE — PROGRESS NOTES
Please call the patient to let him know his cholesterol is mildly elevated. Avoid sugars fats and carbs and be sure to walk daily.     His testosterone is low and his red blood cells resolved to normal. I called over his refill. Please be sure to have follow up with me in 8 weeks to recheck level on this dose.

## 2024-09-17 ENCOUNTER — TELEPHONE (OUTPATIENT)
Dept: CASE MANAGEMENT | Facility: OTHER | Age: 49
End: 2024-09-17
Payer: COMMERCIAL

## 2024-09-20 ENCOUNTER — TELEPHONE (OUTPATIENT)
Dept: CASE MANAGEMENT | Facility: OTHER | Age: 49
End: 2024-09-20
Payer: COMMERCIAL

## 2024-09-20 ENCOUNTER — PATIENT OUTREACH (OUTPATIENT)
Dept: CASE MANAGEMENT | Facility: OTHER | Age: 49
End: 2024-09-20
Payer: COMMERCIAL

## 2024-09-20 DIAGNOSIS — I10 ESSENTIAL HYPERTENSION: Primary | ICD-10-CM

## 2024-09-26 ENCOUNTER — OFFICE VISIT (OUTPATIENT)
Dept: FAMILY MEDICINE CLINIC | Facility: CLINIC | Age: 49
End: 2024-09-26
Payer: COMMERCIAL

## 2024-09-26 VITALS
BODY MASS INDEX: 30.96 KG/M2 | TEMPERATURE: 97.7 F | HEART RATE: 100 BPM | SYSTOLIC BLOOD PRESSURE: 142 MMHG | DIASTOLIC BLOOD PRESSURE: 90 MMHG | HEIGHT: 73 IN | OXYGEN SATURATION: 99 % | WEIGHT: 233.6 LBS

## 2024-09-26 DIAGNOSIS — I10 ESSENTIAL HYPERTENSION: ICD-10-CM

## 2024-09-26 DIAGNOSIS — M25.519 SHOULDER PAIN, UNSPECIFIED CHRONICITY, UNSPECIFIED LATERALITY: ICD-10-CM

## 2024-09-26 DIAGNOSIS — Z23 IMMUNIZATION DUE: Primary | ICD-10-CM

## 2024-09-26 DIAGNOSIS — Z12.5 SCREENING FOR PROSTATE CANCER: ICD-10-CM

## 2024-09-26 DIAGNOSIS — R79.89 LOW TESTOSTERONE IN MALE: ICD-10-CM

## 2024-09-26 PROCEDURE — 90656 IIV3 VACC NO PRSV 0.5 ML IM: CPT | Performed by: STUDENT IN AN ORGANIZED HEALTH CARE EDUCATION/TRAINING PROGRAM

## 2024-09-26 PROCEDURE — 90471 IMMUNIZATION ADMIN: CPT | Performed by: STUDENT IN AN ORGANIZED HEALTH CARE EDUCATION/TRAINING PROGRAM

## 2024-09-26 PROCEDURE — 99214 OFFICE O/P EST MOD 30 MIN: CPT | Performed by: STUDENT IN AN ORGANIZED HEALTH CARE EDUCATION/TRAINING PROGRAM

## 2024-09-26 RX ORDER — LISINOPRIL 20 MG/1
30 TABLET ORAL DAILY
Qty: 90 TABLET | Refills: 0 | Status: SHIPPED | OUTPATIENT
Start: 2024-09-26

## 2024-09-26 RX ORDER — TESTOSTERONE CYPIONATE 200 MG/ML
100 VIAL (ML) INTRAMUSCULAR
Qty: 4 ML | Refills: 0 | Status: SHIPPED | OUTPATIENT
Start: 2024-09-26

## 2024-11-22 DIAGNOSIS — R79.89 LOW TESTOSTERONE IN MALE: ICD-10-CM

## 2024-11-22 RX ORDER — TESTOSTERONE CYPIONATE 200 MG/ML
100 VIAL (ML) INTRAMUSCULAR
Qty: 4 ML | Refills: 2 | Status: SHIPPED | OUTPATIENT
Start: 2024-11-22

## 2024-11-22 RX ORDER — TESTOSTERONE CYPIONATE 200 MG/ML
100 VIAL (ML) INTRAMUSCULAR
Qty: 4 ML | Refills: 0 | Status: CANCELLED | OUTPATIENT
Start: 2024-11-22

## 2024-12-20 ENCOUNTER — OFFICE VISIT (OUTPATIENT)
Dept: FAMILY MEDICINE CLINIC | Facility: CLINIC | Age: 49
End: 2024-12-20
Payer: COMMERCIAL

## 2024-12-20 VITALS
WEIGHT: 235 LBS | HEIGHT: 73 IN | RESPIRATION RATE: 20 BRPM | BODY MASS INDEX: 31.14 KG/M2 | SYSTOLIC BLOOD PRESSURE: 160 MMHG | OXYGEN SATURATION: 98 % | TEMPERATURE: 98.9 F | DIASTOLIC BLOOD PRESSURE: 104 MMHG | HEART RATE: 81 BPM

## 2024-12-20 DIAGNOSIS — R79.89 LOW TESTOSTERONE IN MALE: Primary | ICD-10-CM

## 2024-12-20 DIAGNOSIS — I10 ESSENTIAL HYPERTENSION: ICD-10-CM

## 2024-12-20 DIAGNOSIS — G89.29 CHRONIC LEFT SHOULDER PAIN: ICD-10-CM

## 2024-12-20 DIAGNOSIS — M25.512 CHRONIC LEFT SHOULDER PAIN: ICD-10-CM

## 2024-12-20 PROCEDURE — 99214 OFFICE O/P EST MOD 30 MIN: CPT | Performed by: STUDENT IN AN ORGANIZED HEALTH CARE EDUCATION/TRAINING PROGRAM

## 2024-12-20 RX ORDER — LISINOPRIL 40 MG/1
40 TABLET ORAL DAILY
Qty: 90 TABLET | Refills: 0 | Status: SHIPPED | OUTPATIENT
Start: 2024-12-20

## 2024-12-20 NOTE — PROGRESS NOTES
New Patient Office Visit      Patient Name: Taras Phipps III  : 1975   MRN: [id number]     Chief Complaint:  Hypogonadism (fu)     History of Present Illness:       Htn  Tolerating lisinopril. He was out in the cold working all day so his bp is elevated today from stress. He did not take his med today, but bp has been 139 systolic at home with the home monitor he purchased.     Hypogonadism  He reports no side effects on his med such as aggression leg swelling chest pain sob. No difficulty/issue urinating. He says he feels pretty good on this dose. He will sometimes push the dose out to ten days if he feels okay.       No other complaints today.     Subjective        Past Medical History:   Diagnosis Date    Allergic     Depression     Erectile dysfunction     Have had issue focusing from lack of energy       Past Surgical History:   Procedure Laterality Date    EYELID LACERATION REPAIR      skin cancer    TONSILLECTOMY         Family History   Problem Relation Age of Onset    Mental illness Mother     Breast cancer Mother 40    Alcohol abuse Mother         passed at 44    Mental illness Father     Alcohol abuse Father     Liver disease Father     No Known Problems Sister     Crohn's disease Maternal Aunt     Obesity Maternal Uncle     Diabetes Maternal Uncle     Throat cancer Paternal Aunt     Thyroid disease Paternal Aunt     Arthritis Maternal Grandmother     Hypertension Maternal Grandmother     Heart attack Maternal Grandfather     Stroke Paternal Grandmother     Hypertension Paternal Grandmother     Heart disease Paternal Grandmother         needed ppm in 70s    Dementia Paternal Grandmother     Heart disease Paternal Grandfather         needed ppm in 70s    Heart attack Paternal Grandfather     Colon cancer Neg Hx        Social History     Socioeconomic History    Marital status:    Tobacco Use    Smoking status: Never    Smokeless tobacco: Never    Tobacco comments:     Never used  "  Vaping Use    Vaping status: Never Used   Substance and Sexual Activity    Alcohol use: Yes     Alcohol/week: 4.0 standard drinks of alcohol     Types: 4 Shots of liquor per week     Comment: socially, typically on weekends. Described as excessive on occasion but unable to quantify.    Drug use: Not Currently     Comment: past addiction to pain pills, quit 2720-0665    Sexual activity: Yes          Current Outpatient Medications:     buprenorphine-naloxone (SUBOXONE) 8-2 MG film film, Place 1 film under the tongue Daily., Disp: , Rfl:     lisinopril (PRINIVIL,ZESTRIL) 40 MG tablet, Take 1 tablet by mouth Daily., Disp: 90 tablet, Rfl: 0    meloxicam (Mobic) 7.5 MG tablet, Take 1 tablet by mouth Daily As Needed for Moderate Pain., Disp: 30 tablet, Rfl: 0    Needle, Disp, 23G X 1-1/2\" misc, Use 1 each 1 (One) Time Per Week., Disp: 20 each, Rfl: 1    Syringe 23G X 1\" 3 ML misc, Use 1 each 1 (One) Time Per Week., Disp: 30 each, Rfl: 1    tadalafil (CIALIS) 10 MG tablet, Take 1 tablet by mouth Daily As Needed for Erectile Dysfunction. Take 30 minutes before sexual activity. Do not use more than once daily., Disp: 30 tablet, Rfl: 5    Testosterone Cypionate 200 MG/ML solution, Inject 0.5 mL as directed Every 10 (Ten) Days., Disp: 4 mL, Rfl: 2    No Known Allergies    Objective     Physical Exam:  Vitals:    12/20/24 1631   BP: (!) 160/104   Pulse: 81   Resp: 20   Temp: 98.9 °F (37.2 °C)   TempSrc: Temporal   SpO2: 98%   Weight: 107 kg (235 lb)   Height: 185.4 cm (72.99\")      Body mass index is 31.01 kg/m².     Physical Exam  Constitutional:       General: He is not in acute distress.     Appearance: Normal appearance.   HENT:      Head: Normocephalic and atraumatic.   Eyes:      Extraocular Movements: Extraocular movements intact.   Cardiovascular:      Rate and Rhythm: Normal rate and regular rhythm.      Heart sounds: No murmur heard.  Pulmonary:      Effort: Pulmonary effort is normal. No respiratory distress.      " Breath sounds: Normal breath sounds. No stridor. No wheezing, rhonchi or rales.   Skin:     Findings: No rash.   Neurological:      General: No focal deficit present.      Mental Status: He is alert.   Psychiatric:         Mood and Affect: Mood normal.            Assessment / Plan      Assessment/Plan:   Diagnoses and all orders for this visit:    1. Low testosterone in male (Primary)    2. Essential hypertension  -     lisinopril (PRINIVIL,ZESTRIL) 40 MG tablet; Take 1 tablet by mouth Daily.  Dispense: 90 tablet; Refill: 0    3. Chronic left shoulder pain  -     Ambulatory Referral to Orthopedic Surgery         He will go home and take his bp med, didn't take today and has been working hard. Increase to 40 mg lisinopril given uncontrolled home bp. He has no symptoms of high bp today. He will seek care for any symptoms.      He will go for xray shoulder.   No complaints of pain in it today. Will refer him to ortho for injection given concern for low grade tear or tendonitis.       Return in about 1 month (around 1/20/2025).       Antoinette Paul D.O.  [facility]     Answers submitted by the patient for this visit:  Primary Reason for Visit (Submitted on 12/20/2024)  What is the primary reason for your visit?: Problem Not Listed  Problem not listed (Submitted on 12/20/2024)  Chief Complaint: Other medical problem  abdominal pain: No  anorexia: No  joint pain: No  change in stool: No  chest pain: No  chills: No  nasal congestion: No  cough: No  diaphoresis: No  fatigue: No  fever: No  headaches: No  joint swelling: No  myalgias: No  nausea: No  neck pain: No  numbness: No  rash: No  sore throat: No  swollen glands: No  dysuria: No  vertigo: No  visual change: No  vomiting: No  weakness: No

## 2024-12-31 ENCOUNTER — HOSPITAL ENCOUNTER (OUTPATIENT)
Dept: GENERAL RADIOLOGY | Facility: HOSPITAL | Age: 49
Discharge: HOME | End: 2024-12-31
Payer: COMMERCIAL

## 2024-12-31 ENCOUNTER — LAB (OUTPATIENT)
Dept: LAB | Facility: HOSPITAL | Age: 49
End: 2024-12-31
Payer: COMMERCIAL

## 2024-12-31 DIAGNOSIS — Z12.5 SCREENING FOR PROSTATE CANCER: ICD-10-CM

## 2024-12-31 DIAGNOSIS — R79.89 LOW TESTOSTERONE IN MALE: ICD-10-CM

## 2024-12-31 LAB
CHOLEST SERPL-MCNC: 185 MG/DL (ref 0–200)
DEPRECATED RDW RBC AUTO: 37.9 FL (ref 37–54)
ERYTHROCYTE [DISTWIDTH] IN BLOOD BY AUTOMATED COUNT: 11.7 % (ref 12.3–15.4)
HCT VFR BLD AUTO: 47.3 % (ref 37.5–51)
HDLC SERPL-MCNC: 83 MG/DL (ref 40–60)
HGB BLD-MCNC: 16.5 G/DL (ref 13–17.7)
LDLC SERPL CALC-MCNC: 85 MG/DL (ref 0–100)
LDLC/HDLC SERPL: 1 {RATIO}
MCH RBC QN AUTO: 31.4 PG (ref 26.6–33)
MCHC RBC AUTO-ENTMCNC: 34.9 G/DL (ref 31.5–35.7)
MCV RBC AUTO: 90.1 FL (ref 79–97)
PLATELET # BLD AUTO: 239 10*3/MM3 (ref 140–450)
PMV BLD AUTO: 10.6 FL (ref 6–12)
PSA SERPL-MCNC: 0.41 NG/ML (ref 0–4)
RBC # BLD AUTO: 5.25 10*6/MM3 (ref 4.14–5.8)
TESTOST SERPL-MCNC: 111 NG/DL (ref 249–836)
TRIGL SERPL-MCNC: 95 MG/DL (ref 0–150)
VLDLC SERPL-MCNC: 17 MG/DL (ref 5–40)
WBC NRBC COR # BLD AUTO: 6.13 10*3/MM3 (ref 3.4–10.8)

## 2024-12-31 PROCEDURE — 73030 X-RAY EXAM OF SHOULDER: CPT

## 2024-12-31 PROCEDURE — G0103 PSA SCREENING: HCPCS

## 2024-12-31 PROCEDURE — 80061 LIPID PANEL: CPT

## 2024-12-31 PROCEDURE — 36415 COLL VENOUS BLD VENIPUNCTURE: CPT

## 2024-12-31 PROCEDURE — 85027 COMPLETE CBC AUTOMATED: CPT

## 2024-12-31 PROCEDURE — 84403 ASSAY OF TOTAL TESTOSTERONE: CPT

## 2025-01-02 ENCOUNTER — TELEPHONE (OUTPATIENT)
Dept: FAMILY MEDICINE CLINIC | Facility: CLINIC | Age: 50
End: 2025-01-02
Payer: COMMERCIAL

## 2025-01-02 DIAGNOSIS — N52.2 DRUG-INDUCED ERECTILE DYSFUNCTION: ICD-10-CM

## 2025-01-02 DIAGNOSIS — R79.89 LOW TESTOSTERONE IN MALE: ICD-10-CM

## 2025-01-02 RX ORDER — TESTOSTERONE CYPIONATE 200 MG/ML
100 VIAL (ML) INTRAMUSCULAR
Qty: 4 ML | Refills: 2 | Status: SHIPPED | OUTPATIENT
Start: 2025-01-02

## 2025-01-02 RX ORDER — TADALAFIL 10 MG/1
10 TABLET ORAL DAILY PRN
Qty: 30 TABLET | Refills: 5 | Status: SHIPPED | OUTPATIENT
Start: 2025-01-02

## 2025-01-02 NOTE — TELEPHONE ENCOUNTER
Hub to Relay    Called patient with the message below and left a message for patient to return our call. Also sent message through his my chart account.    The patients testosterone level is too low on this dose. Does he know how many doses he missed

## 2025-03-31 DIAGNOSIS — I10 ESSENTIAL HYPERTENSION: ICD-10-CM

## 2025-03-31 RX ORDER — LISINOPRIL 40 MG/1
40 TABLET ORAL DAILY
Qty: 90 TABLET | Refills: 0 | Status: SHIPPED | OUTPATIENT
Start: 2025-03-31

## 2025-05-09 DIAGNOSIS — F19.11 HISTORY OF SUBSTANCE ABUSE: Primary | ICD-10-CM

## 2025-05-13 ENCOUNTER — CLINICAL SUPPORT (OUTPATIENT)
Dept: FAMILY MEDICINE CLINIC | Facility: CLINIC | Age: 50
End: 2025-05-13
Payer: COMMERCIAL

## 2025-05-13 DIAGNOSIS — F19.11 HISTORY OF SUBSTANCE ABUSE: ICD-10-CM

## 2025-05-21 LAB — DRUGS UR: NORMAL

## 2025-07-14 DIAGNOSIS — I10 ESSENTIAL HYPERTENSION: ICD-10-CM

## 2025-07-15 RX ORDER — LISINOPRIL 40 MG/1
40 TABLET ORAL DAILY
Qty: 90 TABLET | Refills: 0 | Status: SHIPPED | OUTPATIENT
Start: 2025-07-15